# Patient Record
Sex: FEMALE | Race: BLACK OR AFRICAN AMERICAN | Employment: UNEMPLOYED | URBAN - METROPOLITAN AREA
[De-identification: names, ages, dates, MRNs, and addresses within clinical notes are randomized per-mention and may not be internally consistent; named-entity substitution may affect disease eponyms.]

---

## 2019-01-01 ENCOUNTER — TELEPHONE (OUTPATIENT)
Dept: INTERNAL MEDICINE CLINIC | Age: 39
End: 2019-01-01

## 2019-01-01 ENCOUNTER — ANESTHESIA EVENT (OUTPATIENT)
Dept: ENDOSCOPY | Age: 39
End: 2019-01-01
Payer: COMMERCIAL

## 2019-01-01 ENCOUNTER — ANESTHESIA (OUTPATIENT)
Dept: ENDOSCOPY | Age: 39
End: 2019-01-01
Payer: COMMERCIAL

## 2019-01-01 ENCOUNTER — HOSPITAL ENCOUNTER (EMERGENCY)
Age: 39
Discharge: HOME | End: 2019-06-03
Attending: EMERGENCY MEDICINE
Payer: COMMERCIAL

## 2019-01-01 ENCOUNTER — HOSPITAL ENCOUNTER (OUTPATIENT)
Age: 39
Setting detail: OUTPATIENT SURGERY
Discharge: HOME | End: 2019-07-03
Attending: INTERNAL MEDICINE | Admitting: INTERNAL MEDICINE
Payer: COMMERCIAL

## 2019-01-01 ENCOUNTER — OFFICE VISIT (OUTPATIENT)
Dept: OBGYN CLINIC | Age: 39
End: 2019-01-01

## 2019-01-01 ENCOUNTER — HOSPITAL ENCOUNTER (OUTPATIENT)
Age: 39
Setting detail: OUTPATIENT SURGERY
Discharge: HOME | End: 2019-09-13
Attending: INTERNAL MEDICINE | Admitting: INTERNAL MEDICINE
Payer: COMMERCIAL

## 2019-01-01 VITALS
HEART RATE: 73 BPM | BODY MASS INDEX: 40.59 KG/M2 | WEIGHT: 274.03 LBS | OXYGEN SATURATION: 96 % | HEIGHT: 69 IN | TEMPERATURE: 98.1 F | RESPIRATION RATE: 19 BRPM | DIASTOLIC BLOOD PRESSURE: 92 MMHG | SYSTOLIC BLOOD PRESSURE: 144 MMHG

## 2019-01-01 VITALS
HEART RATE: 92 BPM | BODY MASS INDEX: 39.69 KG/M2 | DIASTOLIC BLOOD PRESSURE: 83 MMHG | WEIGHT: 268 LBS | RESPIRATION RATE: 16 BRPM | SYSTOLIC BLOOD PRESSURE: 149 MMHG | TEMPERATURE: 98.2 F | HEIGHT: 69 IN | OXYGEN SATURATION: 100 %

## 2019-01-01 VITALS
OXYGEN SATURATION: 100 % | BODY MASS INDEX: 40.58 KG/M2 | SYSTOLIC BLOOD PRESSURE: 132 MMHG | HEIGHT: 69 IN | WEIGHT: 274 LBS | RESPIRATION RATE: 16 BRPM | TEMPERATURE: 98.8 F | HEART RATE: 79 BPM | DIASTOLIC BLOOD PRESSURE: 89 MMHG

## 2019-01-01 VITALS
BODY MASS INDEX: 40.58 KG/M2 | DIASTOLIC BLOOD PRESSURE: 84 MMHG | SYSTOLIC BLOOD PRESSURE: 126 MMHG | HEIGHT: 69 IN | WEIGHT: 274 LBS

## 2019-01-01 DIAGNOSIS — L81.9 DISCOLORATION OF SKIN OF FINGER: Primary | ICD-10-CM

## 2019-01-01 DIAGNOSIS — L30.8 OTHER ECZEMA: ICD-10-CM

## 2019-01-01 DIAGNOSIS — Z30.09 FAMILY PLANNING COUNSELING: Primary | ICD-10-CM

## 2019-01-01 LAB
25(OH)D3+25(OH)D2 SERPL-MCNC: 13.9 NG/ML (ref 30–100)
ALBUMIN SERPL-MCNC: 4.8 G/DL (ref 3.5–5.5)
ALBUMIN/CREAT UR: 4.3 MG/G CREAT (ref 0–30)
ALBUMIN/GLOB SERPL: 1.7 {RATIO} (ref 1.2–2.2)
ALP SERPL-CCNC: 104 IU/L (ref 39–117)
ALT SERPL-CCNC: 18 IU/L (ref 0–32)
APPEARANCE UR: CLEAR
AST SERPL-CCNC: 17 IU/L (ref 0–40)
BASOPHILS # BLD AUTO: 0 X10E3/UL (ref 0–0.2)
BASOPHILS NFR BLD AUTO: 0 %
BILIRUB SERPL-MCNC: <0.2 MG/DL (ref 0–1.2)
BILIRUB UR QL STRIP: NEGATIVE
BUN SERPL-MCNC: 10 MG/DL (ref 6–24)
BUN/CREAT SERPL: 12 (ref 9–23)
CALCIUM SERPL-MCNC: 9.6 MG/DL (ref 8.7–10.2)
CHLORIDE SERPL-SCNC: 102 MMOL/L (ref 96–106)
CHOLEST SERPL-MCNC: 209 MG/DL (ref 100–199)
CO2 SERPL-SCNC: 18 MMOL/L (ref 20–29)
COLOR UR: YELLOW
CREAT SERPL-MCNC: 0.81 MG/DL (ref 0.57–1)
CREAT UR-MCNC: 143 MG/DL
EOSINOPHIL # BLD AUTO: 0 X10E3/UL (ref 0–0.4)
EOSINOPHIL NFR BLD AUTO: 0 %
ERYTHROCYTE [DISTWIDTH] IN BLOOD BY AUTOMATED COUNT: 13.9 % (ref 12.3–15.4)
EST. AVERAGE GLUCOSE BLD GHB EST-MCNC: 163 MG/DL
GLOBULIN SER CALC-MCNC: 2.9 G/DL (ref 1.5–4.5)
GLUCOSE SERPL-MCNC: 141 MG/DL (ref 65–99)
GLUCOSE UR QL: NEGATIVE
HBA1C MFR BLD: 7.3 % (ref 4.8–5.6)
HCG UR QL: NEGATIVE
HCT VFR BLD AUTO: 38.4 % (ref 34–46.6)
HDLC SERPL-MCNC: 67 MG/DL
HGB BLD-MCNC: 13 G/DL (ref 11.1–15.9)
HGB UR QL STRIP: NEGATIVE
IMM GRANULOCYTES # BLD AUTO: 0 X10E3/UL (ref 0–0.1)
IMM GRANULOCYTES NFR BLD AUTO: 0 %
INTERPRETATION, 910389: NORMAL
KETONES UR QL STRIP: ABNORMAL
LDLC SERPL CALC-MCNC: 126 MG/DL (ref 0–99)
LEUKOCYTE ESTERASE UR QL STRIP: NEGATIVE
LYMPHOCYTES # BLD AUTO: 2 X10E3/UL (ref 0.7–3.1)
LYMPHOCYTES NFR BLD AUTO: 22 %
Lab: NORMAL
MCH RBC QN AUTO: 27 PG (ref 26.6–33)
MCHC RBC AUTO-ENTMCNC: 33.9 G/DL (ref 31.5–35.7)
MCV RBC AUTO: 80 FL (ref 79–97)
MICRO URNS: ABNORMAL
MICROALBUMIN UR-MCNC: 6.2 UG/ML
MONOCYTES # BLD AUTO: 0.7 X10E3/UL (ref 0.1–0.9)
MONOCYTES NFR BLD AUTO: 8 %
NEUTROPHILS # BLD AUTO: 6.3 X10E3/UL (ref 1.4–7)
NEUTROPHILS NFR BLD AUTO: 70 %
NITRITE UR QL STRIP: NEGATIVE
PH UR STRIP: 5.5 [PH] (ref 5–7.5)
PLATELET # BLD AUTO: 415 X10E3/UL (ref 150–450)
POTASSIUM SERPL-SCNC: 4.6 MMOL/L (ref 3.5–5.2)
PROT SERPL-MCNC: 7.7 G/DL (ref 6–8.5)
PROT UR QL STRIP: NEGATIVE
RBC # BLD AUTO: 4.81 X10E6/UL (ref 3.77–5.28)
SODIUM SERPL-SCNC: 137 MMOL/L (ref 134–144)
SP GR UR: 1.02 (ref 1–1.03)
TRIGL SERPL-MCNC: 80 MG/DL (ref 0–149)
TSH SERPL DL<=0.005 MIU/L-ACNC: 0.5 UIU/ML (ref 0.45–4.5)
UROBILINOGEN UR STRIP-MCNC: 0.2 MG/DL (ref 0.2–1)
VLDLC SERPL CALC-MCNC: 16 MG/DL (ref 5–40)
WBC # BLD AUTO: 9 X10E3/UL (ref 3.4–10.8)

## 2019-01-01 PROCEDURE — 76060000031 HC ANESTHESIA FIRST 0.5 HR: Performed by: INTERNAL MEDICINE

## 2019-01-01 PROCEDURE — 74011250636 HC RX REV CODE- 250/636: Performed by: INTERNAL MEDICINE

## 2019-01-01 PROCEDURE — 77030021593 HC FCPS BIOP ENDOSC BSC -A: Performed by: INTERNAL MEDICINE

## 2019-01-01 PROCEDURE — 74011000250 HC RX REV CODE- 250: Performed by: NURSE ANESTHETIST, CERTIFIED REGISTERED

## 2019-01-01 PROCEDURE — 81025 URINE PREGNANCY TEST: CPT

## 2019-01-01 PROCEDURE — 74011250636 HC RX REV CODE- 250/636

## 2019-01-01 PROCEDURE — 76040000019: Performed by: INTERNAL MEDICINE

## 2019-01-01 PROCEDURE — 74011250636 HC RX REV CODE- 250/636: Performed by: NURSE ANESTHETIST, CERTIFIED REGISTERED

## 2019-01-01 PROCEDURE — 88305 TISSUE EXAM BY PATHOLOGIST: CPT

## 2019-01-01 PROCEDURE — 99282 EMERGENCY DEPT VISIT SF MDM: CPT

## 2019-01-01 PROCEDURE — 77030027957 HC TBNG IRR ENDOGTR BUSS -B: Performed by: INTERNAL MEDICINE

## 2019-01-01 RX ORDER — PROPOFOL 10 MG/ML
INJECTION, EMULSION INTRAVENOUS AS NEEDED
Status: DISCONTINUED | OUTPATIENT
Start: 2019-01-01 | End: 2019-01-01 | Stop reason: HOSPADM

## 2019-01-01 RX ORDER — SODIUM CHLORIDE 9 MG/ML
50 INJECTION, SOLUTION INTRAVENOUS CONTINUOUS
Status: DISCONTINUED | OUTPATIENT
Start: 2019-01-01 | End: 2019-01-01 | Stop reason: HOSPADM

## 2019-01-01 RX ORDER — SULFAMETHOXAZOLE AND TRIMETHOPRIM 800; 160 MG/1; MG/1
1 TABLET ORAL 2 TIMES DAILY
Qty: 14 TAB | Refills: 0 | Status: SHIPPED | OUTPATIENT
Start: 2019-01-01 | End: 2019-01-01

## 2019-01-01 RX ORDER — TRIAMCINOLONE ACETONIDE OINTMENT USP, 0.05% 0.5 MG/G
430 OINTMENT TOPICAL 2 TIMES DAILY
Qty: 430 G | Refills: 0 | Status: CANCELLED | OUTPATIENT
Start: 2019-01-01

## 2019-01-01 RX ORDER — FLUMAZENIL 0.1 MG/ML
0.2 INJECTION INTRAVENOUS
Status: DISCONTINUED | OUTPATIENT
Start: 2019-01-01 | End: 2019-01-01 | Stop reason: HOSPADM

## 2019-01-01 RX ORDER — FENTANYL CITRATE 50 UG/ML
200 INJECTION, SOLUTION INTRAMUSCULAR; INTRAVENOUS
Status: DISCONTINUED | OUTPATIENT
Start: 2019-01-01 | End: 2019-01-01 | Stop reason: HOSPADM

## 2019-01-01 RX ORDER — SODIUM CHLORIDE 0.9 % (FLUSH) 0.9 %
5-40 SYRINGE (ML) INJECTION AS NEEDED
Status: DISCONTINUED | OUTPATIENT
Start: 2019-01-01 | End: 2019-01-01 | Stop reason: HOSPADM

## 2019-01-01 RX ORDER — EPINEPHRINE 0.1 MG/ML
1 INJECTION INTRACARDIAC; INTRAVENOUS
Status: DISCONTINUED | OUTPATIENT
Start: 2019-01-01 | End: 2019-01-01 | Stop reason: HOSPADM

## 2019-01-01 RX ORDER — NALOXONE HYDROCHLORIDE 0.4 MG/ML
0.4 INJECTION, SOLUTION INTRAMUSCULAR; INTRAVENOUS; SUBCUTANEOUS
Status: DISCONTINUED | OUTPATIENT
Start: 2019-01-01 | End: 2019-01-01 | Stop reason: HOSPADM

## 2019-01-01 RX ORDER — DEXTROMETHORPHAN/PSEUDOEPHED 2.5-7.5/.8
1.2 DROPS ORAL
Status: DISCONTINUED | OUTPATIENT
Start: 2019-01-01 | End: 2019-01-01 | Stop reason: HOSPADM

## 2019-01-01 RX ORDER — MIDAZOLAM HYDROCHLORIDE 1 MG/ML
.25-1 INJECTION, SOLUTION INTRAMUSCULAR; INTRAVENOUS
Status: DISCONTINUED | OUTPATIENT
Start: 2019-01-01 | End: 2019-01-01 | Stop reason: HOSPADM

## 2019-01-01 RX ORDER — SODIUM CHLORIDE 9 MG/ML
150 INJECTION, SOLUTION INTRAVENOUS CONTINUOUS
Status: DISCONTINUED | OUTPATIENT
Start: 2019-01-01 | End: 2019-01-01 | Stop reason: HOSPADM

## 2019-01-01 RX ORDER — ATROPINE SULFATE 0.1 MG/ML
0.5 INJECTION INTRAVENOUS
Status: DISCONTINUED | OUTPATIENT
Start: 2019-01-01 | End: 2019-01-01 | Stop reason: HOSPADM

## 2019-01-01 RX ORDER — TRIAMCINOLONE ACETONIDE OINTMENT USP, 0.05% 0.5 MG/G
430 OINTMENT TOPICAL 2 TIMES DAILY
Qty: 430 G | Refills: 0 | Status: SHIPPED | OUTPATIENT
Start: 2019-01-01

## 2019-01-01 RX ORDER — FLUCONAZOLE 100 MG/1
200 TABLET ORAL DAILY
Qty: 14 TAB | Refills: 0 | Status: SHIPPED | OUTPATIENT
Start: 2019-01-01 | End: 2019-01-01

## 2019-01-01 RX ORDER — LIDOCAINE HYDROCHLORIDE 20 MG/ML
INJECTION, SOLUTION EPIDURAL; INFILTRATION; INTRACAUDAL; PERINEURAL AS NEEDED
Status: DISCONTINUED | OUTPATIENT
Start: 2019-01-01 | End: 2019-01-01 | Stop reason: HOSPADM

## 2019-01-01 RX ORDER — SODIUM CHLORIDE 0.9 % (FLUSH) 0.9 %
5-40 SYRINGE (ML) INJECTION EVERY 8 HOURS
Status: DISCONTINUED | OUTPATIENT
Start: 2019-01-01 | End: 2019-01-01 | Stop reason: HOSPADM

## 2019-01-01 RX ORDER — SODIUM CHLORIDE 9 MG/ML
INJECTION, SOLUTION INTRAVENOUS
Status: DISCONTINUED | OUTPATIENT
Start: 2019-01-01 | End: 2019-01-01 | Stop reason: HOSPADM

## 2019-01-01 RX ORDER — FENTANYL CITRATE 50 UG/ML
25-200 INJECTION, SOLUTION INTRAMUSCULAR; INTRAVENOUS
Status: DISCONTINUED | OUTPATIENT
Start: 2019-01-01 | End: 2019-01-01 | Stop reason: HOSPADM

## 2019-01-01 RX ORDER — MIDAZOLAM HYDROCHLORIDE 1 MG/ML
.25-5 INJECTION, SOLUTION INTRAMUSCULAR; INTRAVENOUS
Status: DISCONTINUED | OUTPATIENT
Start: 2019-01-01 | End: 2019-01-01 | Stop reason: HOSPADM

## 2019-01-01 RX ORDER — DIPHENHYDRAMINE HYDROCHLORIDE 50 MG/ML
50 INJECTION, SOLUTION INTRAMUSCULAR; INTRAVENOUS ONCE
Status: DISCONTINUED | OUTPATIENT
Start: 2019-01-01 | End: 2019-01-01 | Stop reason: HOSPADM

## 2019-01-01 RX ADMIN — PROPOFOL 100 MG: 10 INJECTION, EMULSION INTRAVENOUS at 16:44

## 2019-01-01 RX ADMIN — SODIUM CHLORIDE: 9 INJECTION, SOLUTION INTRAVENOUS at 16:33

## 2019-01-01 RX ADMIN — SODIUM CHLORIDE: 900 INJECTION, SOLUTION INTRAVENOUS at 15:50

## 2019-01-01 RX ADMIN — LIDOCAINE HYDROCHLORIDE 100 MG: 20 INJECTION, SOLUTION EPIDURAL; INFILTRATION; INTRACAUDAL; PERINEURAL at 16:37

## 2019-01-01 RX ADMIN — PROPOFOL 100 MG: 10 INJECTION, EMULSION INTRAVENOUS at 16:42

## 2019-01-01 RX ADMIN — PROPOFOL 50 MG: 10 INJECTION, EMULSION INTRAVENOUS at 16:53

## 2019-01-01 RX ADMIN — PROPOFOL 50 MG: 10 INJECTION, EMULSION INTRAVENOUS at 16:07

## 2019-01-01 RX ADMIN — PROPOFOL 50 MG: 10 INJECTION, EMULSION INTRAVENOUS at 16:45

## 2019-01-01 RX ADMIN — PROPOFOL 50 MG: 10 INJECTION, EMULSION INTRAVENOUS at 16:47

## 2019-01-01 RX ADMIN — PROPOFOL 100 MG: 10 INJECTION, EMULSION INTRAVENOUS at 16:39

## 2019-01-01 RX ADMIN — PROPOFOL 100 MG: 10 INJECTION, EMULSION INTRAVENOUS at 16:37

## 2019-01-01 RX ADMIN — PROPOFOL 50 MG: 10 INJECTION, EMULSION INTRAVENOUS at 15:59

## 2019-01-01 RX ADMIN — LIDOCAINE HYDROCHLORIDE 100 MG: 20 INJECTION, SOLUTION EPIDURAL; INFILTRATION; INTRACAUDAL; PERINEURAL at 15:59

## 2019-01-01 RX ADMIN — PROPOFOL 50 MG: 10 INJECTION, EMULSION INTRAVENOUS at 16:03

## 2019-01-01 RX ADMIN — PROPOFOL 50 MG: 10 INJECTION, EMULSION INTRAVENOUS at 16:50

## 2019-01-01 RX ADMIN — PROPOFOL 50 MG: 10 INJECTION, EMULSION INTRAVENOUS at 16:00

## 2019-01-01 RX ADMIN — PROPOFOL 25 MG: 10 INJECTION, EMULSION INTRAVENOUS at 16:55

## 2019-01-01 RX ADMIN — PROPOFOL 50 MG: 10 INJECTION, EMULSION INTRAVENOUS at 16:02

## 2019-01-30 ENCOUNTER — OFFICE VISIT (OUTPATIENT)
Dept: INTERNAL MEDICINE CLINIC | Age: 39
End: 2019-01-30

## 2019-01-30 VITALS
HEART RATE: 90 BPM | WEIGHT: 268 LBS | TEMPERATURE: 98.4 F | HEIGHT: 69 IN | RESPIRATION RATE: 16 BRPM | DIASTOLIC BLOOD PRESSURE: 83 MMHG | BODY MASS INDEX: 39.69 KG/M2 | SYSTOLIC BLOOD PRESSURE: 139 MMHG | OXYGEN SATURATION: 100 %

## 2019-01-30 DIAGNOSIS — N92.6 IRREGULAR MENSES: ICD-10-CM

## 2019-01-30 DIAGNOSIS — Q80.0 ICHTHYOSIS VULGARIS: Chronic | ICD-10-CM

## 2019-01-30 DIAGNOSIS — E11.8 TYPE 2 DIABETES MELLITUS WITH COMPLICATION, WITHOUT LONG-TERM CURRENT USE OF INSULIN (HCC): ICD-10-CM

## 2019-01-30 DIAGNOSIS — Z12.39 SCREENING BREAST EXAMINATION: ICD-10-CM

## 2019-01-30 DIAGNOSIS — Z00.00 ROUTINE GENERAL MEDICAL EXAMINATION AT A HEALTH CARE FACILITY: Primary | ICD-10-CM

## 2019-01-30 DIAGNOSIS — I10 ESSENTIAL HYPERTENSION: ICD-10-CM

## 2019-01-30 DIAGNOSIS — E55.9 VITAMIN D DEFICIENCY: ICD-10-CM

## 2019-01-30 NOTE — PROGRESS NOTES
HISTORY OF PRESENT ILLNESS Rupal Cintron is a 43 y.o. female here after 5 years. She is here for complete physical. 
She is morbidly obese, weight is same like last time. Watching diet and exercise but not able to lose weight. Wondering if she can use diet pills or see bariatric surgeon. Has irregular menses. Planning to get pregnant. Would like to see genetic specialist to talk about her medical conditions for which she get pregnant. Her skin is very dry, she had using a topical cream.  Seeing a skin specialist. 
Blood pressure slightly elevated, she is not using any other day hypertensive medications. She was diabetic also. Not on medications. Need a mammogram. 
HPI Review of Systems Constitutional: Negative. HENT: Negative. Eyes: Negative. Respiratory: Negative. Cardiovascular: Negative. Gastrointestinal: Negative. Genitourinary: Negative. Musculoskeletal: Negative. Skin: Positive for rash. Neurological: Negative. Endo/Heme/Allergies: Negative. Psychiatric/Behavioral: Negative. Physical Exam  
Constitutional: She is oriented to person, place, and time. She appears well-developed and well-nourished. No distress. HENT:  
Head: Normocephalic and atraumatic. Right Ear: External ear normal.  
Left Ear: External ear normal.  
Nose: Nose normal.  
Mouth/Throat: Oropharynx is clear and moist. No oropharyngeal exudate. Eyes: Conjunctivae and EOM are normal. Pupils are equal, round, and reactive to light. Neck: Normal range of motion. Neck supple. No JVD present. No thyromegaly present. Cardiovascular: Normal rate, regular rhythm, normal heart sounds and intact distal pulses. Pulmonary/Chest: Effort normal and breath sounds normal. No respiratory distress. She has no wheezes. Abdominal: Soft. Bowel sounds are normal. She exhibits no distension. There is no tenderness. Musculoskeletal: She exhibits no edema or tenderness. Lymphadenopathy: She has no cervical adenopathy. Neurological: She is alert and oriented to person, place, and time. She has normal reflexes. She displays normal reflexes. No cranial nerve deficit. She exhibits normal muscle tone. Coordination normal.  
Skin:  
Very dry and thickened skin all over her body. Psychiatric: She has a normal mood and affect. Her behavior is normal.  
 
 
ASSESSMENT and PLAN Diagnoses and all orders for this visit: 1. Routine general medical examination at a health care facility Seems healthy and exercise. Will check, 
-     CBC WITH AUTOMATED DIFF 
-     METABOLIC PANEL, COMPREHENSIVE 
-     LIPID PANEL 
-     TSH 3RD GENERATION 
-     URINALYSIS W/ RFLX MICROSCOPIC 2. Type 2 diabetes mellitus with complication, without long-term current use of insulin ([address] Not on med. be on ADA diet. Will check, 
-     CBC WITH AUTOMATED DIFF 
-     METABOLIC PANEL, COMPREHENSIVE 
-     LIPID PANEL 
-     MICROALBUMIN, UR, RAND W/ MICROALB/CREAT RATIO 
-     HEMOGLOBIN A1C WITH EAG 3. Essential hypertension Not on med. will check, 
-     CBC WITH AUTOMATED DIFF 
-     METABOLIC PANEL, COMPREHENSIVE 
-     LIPID PANEL 4. Irregular menses She is seeing Dr. Michael Auguste her gynecologist for long time. She would like to try a new gynecologist since she has irregular menses and she would like to get pregnant. She also looking to have a genetic counselor to discuss her medical condition icthyosis before she gets pregnant. 
-     Jennifer Lenz 5. Vitamin D deficiency 
-     VITAMIN D, 25 HYDROXY 6. Ichthyosis vulgaris Seeing skin specialist.  Not using any steroid topical cream right now. Will refer, 
-     REFERRAL TO GENETICS 7. Screening breast examination Will refer, 
-     [facility] MAMMO BI SCREENING INCL CAD; Future Discussed expected course/resolution/complications of diagnosis in detail with patient. Medication risks/benefits/costs/interactions/alternatives discussed with patient. Pt was given an after visit summary which includes diagnoses, current medications & vitals. Pt expressed understanding with the diagnosis and plan.

## 2019-01-30 NOTE — PROGRESS NOTES
Identified pt with two pt identifiers(name and ). Reviewed record in preparation for visit and have obtained necessary documentation. Chief Complaint Patient presents with  Complete Physical  
 Labs Health Maintenance Due Topic  FOOT EXAM Q1   
 MICROALBUMIN Q1   
 Pneumococcal 19-64 Medium Risk (1 of 1 - PPSV23)  DTaP/Tdap/Td series (1 - Tdap)  PAP AKA CERVICAL CYTOLOGY  HEMOGLOBIN A1C Q6M   
 LIPID PANEL Q1   
 EYE EXAM RETINAL OR DILATED Visit Vitals /83 (BP 1 Location: Left arm, BP Patient Position: Sitting) Pulse 90 Temp 98.4 °F (36.9 °C) (Oral) Resp 16 Ht 5' 9\" (1.753 m) Wt 268 lb (121.6 kg) SpO2 100% BMI 39.58 kg/m² Coordination of Care Questionnaire: 
:  
1) Have you been to an emergency room, urgent care, or hospitalized since your last visit?   no If yes, where when, and reason for visit? 2. Have seen or consulted any other health care provider since your last visit? NO If yes, where when, and reason for visit? Patient is accompanied by self I have received verbal consent from Rashaad Andrade to discuss any/all medical information while they are present in the room.

## 2019-01-30 NOTE — PATIENT INSTRUCTIONS
Prediabetes: Care Instructions Your Care Instructions Prediabetes is a warning sign that you are at risk for getting type 2 diabetes. It means that your blood sugar is higher than it should be. The food you eat turns into sugar, which your body uses for energy. Normally, an organ called the pancreas makes insulin, which allows the sugar in your blood to get into your body's cells. But when your body can't use insulin the right way, the sugar doesn't move into cells. It stays in your blood instead. This is called insulin resistance. The buildup of sugar in the blood causes prediabetes. The good news is that lifestyle changes may help you get your blood sugar back to normal and help you avoid or delay diabetes. Follow-up care is a key part of your treatment and safety. Be sure to make and go to all appointments, and call your doctor if you are having problems. It's also a good idea to know your test results and keep a list of the medicines you take. How can you care for yourself at home? · Watch your weight. A healthy weight helps your body use insulin properly. · Limit the amount of calories, sweets, and unhealthy fat you eat. Ask your doctor if you should see a dietitian. A registered dietitian can help you create meal plans that fit your lifestyle. · Get at least 30 minutes of exercise on most days of the week. Exercise helps control your blood sugar. It also helps you maintain a healthy weight. Walking is a good choice. You also may want to do other activities, such as running, swimming, cycling, or playing tennis or team sports. · Do not smoke. Smoking can make prediabetes worse. If you need help quitting, talk to your doctor about stop-smoking programs and medicines. These can increase your chances of quitting for good. · If your doctor prescribed medicines, take them exactly as prescribed. Call your doctor if you think you are having a problem with your medicine. You will get more details on the specific medicines your doctor prescribes. When should you call for help? Watch closely for changes in your health, and be sure to contact your doctor if: 
  · You have any symptoms of diabetes. These may include: 
? Being thirsty more often. ? Urinating more. ? Being hungrier. ? Losing weight. ? Being very tired. ? Having blurry vision.  
  · You have a wound that will not heal.  
  · You have an infection that will not go away.  
  · You have problems with your blood pressure.  
  · You want more information about diabetes and how you can keep from getting it. Where can you learn more? Go to http://gabrielle-marcos.info/. Enter I222 in the search box to learn more about \"Prediabetes: Care Instructions. \" Current as of: July 25, 2018 Content Version: 11.9 © 8498-6577 [employer]. Care instructions adapted under license by [employer] (which disclaims liability or warranty for this information). If you have questions about a medical condition or this instruction, always ask your healthcare professional. [address] any warranty or liability for your use of this information. DASH Diet: Care Instructions Your Care Instructions The DASH diet is an eating plan that can help lower your blood pressure. DASH stands for Dietary Approaches to Stop Hypertension. Hypertension is high blood pressure. The DASH diet focuses on eating foods that are high in calcium, potassium, and magnesium. These nutrients can lower blood pressure. The foods that are highest in these nutrients are fruits, vegetables, low-fat dairy products, nuts, seeds, and legumes. But taking calcium, potassium, and magnesium supplements instead of eating foods that are high in those nutrients does not have the same effect. The DASH diet also includes whole grains, fish, and poultry. The DASH diet is one of several lifestyle changes your doctor may recommend to lower your high blood pressure. Your doctor may also want you to decrease the amount of sodium in your diet. Lowering sodium while following the DASH diet can lower blood pressure even further than just the DASH diet alone. Follow-up care is a key part of your treatment and safety. Be sure to make and go to all appointments, and call your doctor if you are having problems. It's also a good idea to know your test results and keep a list of the medicines you take. How can you care for yourself at home? Following the DASH diet · Eat 4 to 5 servings of fruit each day. A serving is 1 medium-sized piece of fruit, ½ cup chopped or canned fruit, 1/4 cup dried fruit, or 4 ounces (½ cup) of fruit juice. Choose fruit more often than fruit juice. · Eat 4 to 5 servings of vegetables each day. A serving is 1 cup of lettuce or raw leafy vegetables, ½ cup of chopped or cooked vegetables, or 4 ounces (½ cup) of vegetable juice. Choose vegetables more often than vegetable juice. · Get 2 to 3 servings of low-fat and fat-free dairy each day. A serving is 8 ounces of milk, 1 cup of yogurt, or 1 ½ ounces of cheese. · Eat 6 to 8 servings of grains each day. A serving is 1 slice of bread, 1 ounce of dry cereal, or ½ cup of cooked rice, pasta, or cooked cereal. Try to choose whole-grain products as much as possible. · Limit lean meat, poultry, and fish to 2 servings each day. A serving is 3 ounces, about the size of a deck of cards. · Eat 4 to 5 servings of nuts, seeds, and legumes (cooked dried beans, lentils, and split peas) each week. A serving is 1/3 cup of nuts, 2 tablespoons of seeds, or ½ cup of cooked beans or peas. · Limit fats and oils to 2 to 3 servings each day. A serving is 1 teaspoon of vegetable oil or 2 tablespoons of salad dressing. · Limit sweets and added sugars to 5 servings or less a week.  A serving is 1 tablespoon jelly or jam, ½ cup sorbet, or 1 cup of lemonade. · Eat less than 2,300 milligrams (mg) of sodium a day. If you limit your sodium to 1,500 mg a day, you can lower your blood pressure even more. Tips for success · Start small. Do not try to make dramatic changes to your diet all at once. You might feel that you are missing out on your favorite foods and then be more likely to not follow the plan. Make small changes, and stick with them. Once those changes become habit, add a few more changes. · Try some of the following: ? Make it a goal to eat a fruit or vegetable at every meal and at snacks. This will make it easy to get the recommended amount of fruits and vegetables each day. ? Try yogurt topped with fruit and nuts for a snack or healthy dessert. ? Add lettuce, tomato, cucumber, and onion to sandwiches. ? Combine a ready-made pizza crust with low-fat mozzarella cheese and lots of vegetable toppings. Try using tomatoes, squash, spinach, broccoli, carrots, cauliflower, and onions. ? Have a variety of cut-up vegetables with a low-fat dip as an appetizer instead of chips and dip. ? Sprinkle sunflower seeds or chopped almonds over salads. Or try adding chopped walnuts or almonds to cooked vegetables. ? Try some vegetarian meals using beans and peas. Add garbanzo or kidney beans to salads. Make burritos and tacos with mashed steinberg beans or black beans. Where can you learn more? Go to http://gabrielle-marcos.info/. Enter H605 in the search box to learn more about \"DASH Diet: Care Instructions. \" Current as of: July 22, 2018 Content Version: 11.9 © 2216-9920 [employer]. Care instructions adapted under license by [employer] (which disclaims liability or warranty for this information).  If you have questions about a medical condition or this instruction, always ask your healthcare professional. [employer] disclaims any warranty or liability for your use of this information.

## 2019-04-15 NOTE — TELEPHONE ENCOUNTER
The 430g triamcinolone acetonide 0.05% ointment has been discontinued.  Pharmacy has 1% in stock  Please call pharmacy for update

## 2019-04-17 NOTE — TELEPHONE ENCOUNTER
Spoke with the pharmacy at [facility] and they stated that they are waiting for the patient to  the medication from the pharmacy.

## 2019-06-03 NOTE — ED NOTES
April, [place] gave and reviewed discharge instructions with patient. Patient verbalizes understanding of discharge instructions. Pt alert and oriented, appears in no acute distress, respirations equal and unlabored. Ambulatory upon discharge with steady gait.

## 2019-06-03 NOTE — DISCHARGE INSTRUCTIONS
Patient Education        Toenail Fungus: Care Instructions  Your Care Instructions  A toenail that is infected by a fungus usually turns white or yellow. As the fungus spreads, the nail turns a darker color and gets thicker, and its edges start to turn ragged and crumble. A bad infection can cause toe pain, and the nail may pull away from the toe. Toenails that are exposed to moisture and warmth a lot are more likely to get infected by a fungus. This can happen from wearing sweaty shoes often and from walking barefoot on shower floors. It is hard to treat toenail fungus, and the infection can return after it has cleared up. But medicines can sometimes get rid of toenail fungus for good. If the infection is very bad, or if it causes a lot of pain, you may need to have the nail removed. Follow-up care is a key part of your treatment and safety. Be sure to make and go to all appointments, and call your doctor if you are having problems. It's also a good idea to know your test results and keep a list of the medicines you take. How can you care for yourself at home? · Take your medicines exactly as prescribed. Call your doctor if you have any problems with your medicine. You will get more details on the specific medicines your doctor prescribes. · If your doctor gave you a cream or liquid to put on your toenail, use it exactly as directed. · Wash your feet often, and wash your hands after touching your feet. · Keep your toenails clean and dry. Dry your feet completely after you bathe and before you put on shoes and socks. · Keep your toenails trimmed. · Change socks often. Wear dry socks that absorb moisture. · Do not go barefoot in public places. · Use a spray or powder that fights fungus on your feet and in your shoes. · Do not pick at the skin around your nails. · Do not use nail polish or fake nails on your toenails. When should you call for help?   Call your doctor now or seek immediate medical care if:    · You have signs of infection, such as:  ? Increased pain, swelling, warmth, or redness. ? Red streaks leading from the site. ? Pus draining from the site. ? A fever.     · You have new or increased toe pain.    Watch closely for changes in your health, and be sure to contact your doctor if:    · You do not get better as expected. Where can you learn more? Go to http://gabrielle-marcos.info/. Enter D202 in the search box to learn more about \"Toenail Fungus: Care Instructions. \"  Current as of: April 17, 2018  Content Version: 11.9  © 2554-3086 [employer]. Care instructions adapted under license by [employer] (which disclaims liability or warranty for this information). If you have questions about a medical condition or this instruction, always ask your healthcare professional. [address] any warranty or liability for your use of this information.

## 2019-06-03 NOTE — ED TRIAGE NOTES
Complaining of pain right ring finger nail. She  has discoloration near the cuticle. She denies any trauma.

## 2019-06-03 NOTE — ED PROVIDER NOTES
34 yo female with complaint rt 4th finger pain and discoloration to the proximal nail bed for past four days. No injury to the area. No drainage from the area. Last manicure one month ago. No fever, headache, sore throat, cough, rhinorrhea, sneezing, SOB, abdominal pain, nausea, vomiting, or urinary complaints. Hx of similar associated with a fungal infection. The history is provided by the patient.    Finger Pain           Past Medical History:   Diagnosis Date    Back pain     Bowel obstruction 07/2008    Diabetes ([address]     Dysmenorrhea     Dyspepsia and other specified disorders of function of stomach     Hypertension     Maxillary sinusitis     Musculoskeletal disorder     Other specified disease of sebaceous glands     Rhinitis allergic     Stool color black     Ulcerative colitis     Variants of migraine, not elsewhere classified, with intractable migraine, so stated, without mention of status migrainosus        Past Surgical History:   Procedure Laterality Date    ABDOMEN SURGERY PROC UNLISTED      bowel resection    HX APPENDECTOMY           Family History:   Problem Relation Age of Onset    Hypertension Mother     Diabetes Father     Heart Disease Father     Hypertension Father     Stroke Maternal Grandmother        Social History     Socioeconomic History    Marital status:      Spouse name: Not on file    Number of children: Not on file    Years of education: Not on file    Highest education level: Not on file   Occupational History    Not on file   Social Needs    Financial resource strain: Not on file    Food insecurity:     Worry: Not on file     Inability: Not on file    Transportation needs:     Medical: Not on file     Non-medical: Not on file   Tobacco Use    Smoking status: Never Smoker    Smokeless tobacco: Never Used   Substance and Sexual Activity    Alcohol use: Yes     Comment: socially    Drug use: No    Sexual activity: Yes   Lifestyle    Physical activity:     Days per week: Not on file     Minutes per session: Not on file    Stress: Not on file   Relationships    Social connections:     Talks on phone: Not on file     Gets together: Not on file     Attends Hinduism service: Not on file     Active member of club or organization: Not on file     Attends meetings of clubs or organizations: Not on file     Relationship status: Not on file    Intimate partner violence:     Fear of current or ex partner: Not on file     Emotionally abused: Not on file     Physically abused: Not on file     Forced sexual activity: Not on file   Other Topics Concern    Not on file   Social History Narrative    Not on file         ALLERGIES: Latex    Review of Systems   Constitutional: Negative for fever. HENT: Positive for congestion and sinus pressure. Negative for ear pain and sore throat. Respiratory: Negative for cough, chest tightness and shortness of breath. Gastrointestinal: Negative for abdominal pain, constipation, diarrhea and nausea. Genitourinary: Negative for dysuria, frequency and urgency. Musculoskeletal: Positive for arthralgias (rt 4th finger). Skin: Positive for color change. Vitals:    06/03/19 1720   SpO2: 100%            Physical Exam   Constitutional: She appears well-developed and well-nourished. No distress. Well appearing adult female in NAD   HENT:   Head: Normocephalic and atraumatic. Right Ear: External ear normal.   Left Ear: External ear normal.   Mouth/Throat: Oropharynx is clear and moist.   Eyes: Pupils are equal, round, and reactive to light. Conjunctivae are normal.   Cardiovascular: Normal rate, regular rhythm and normal heart sounds. Pulmonary/Chest: Effort normal. No respiratory distress. She has no wheezes. Abdominal: Soft. Bowel sounds are normal. She exhibits no distension. There is no tenderness. There is no rebound. Neurological: She is alert. Skin: Skin is warm and dry.  No ecchymosis, no laceration and no lesion noted. Nursing note and vitals reviewed. MDM  Number of Diagnoses or Management Options  Discoloration of skin of finger:   Diagnosis management comments: 34 yo female with complaint of rt 4th finger nail discoloration. ? Bacteria vs fungal vs eczema    Plan  Bactrim  Diflucan  PCP follow-up.  [place]is [place], [place]           Procedures

## 2019-06-17 NOTE — TELEPHONE ENCOUNTER
Called the pt and advised her that Dr. Luo Gave will review her labs and she will be called with her results. The pt voiced thanks and understanding.

## 2019-06-18 NOTE — PROGRESS NOTES
Diabetes is worse, need to start medicine. Be on ADA diet and exercise. Need office visit. Need to watch carbohydrate and sweets. Total cholesterol and LDL slightly elevated. Be on low-cholesterol diet and exercise.

## 2019-06-19 NOTE — PROGRESS NOTES
The pt called the office and requested her lab results. After verifying HIPAA, reviewed the pt's labs with her and also discussed the provider's recommendations. The pt was provided an appt for July 1, 2019 @ 1300 with an arrival time of 1245. The pt voiced thanks and full understanding.

## 2019-06-24 NOTE — PROGRESS NOTES
Dmitriy Coffey is a 35 y.o. female who desires to concieve; patient states her cycle are painful    Challenged due hx ulcerative colitis and ichthyosis vulgaris; requires extensive skin care. Hx SBO. Recently diagnosed with diabetes; exercising on regular basis  Her current method of family planning is none. The patient is not sexually active. She developed this problem approximately 3 years ago. Her relevant past medical history:   Past Medical History:   Diagnosis Date    Back pain     Bowel obstruction 07/2008    Diabetes ([address]     Dysmenorrhea     Dyspepsia and other specified disorders of function of stomach     Hypertension     Maxillary sinusitis     Musculoskeletal disorder     Other specified disease of sebaceous glands     Rhinitis allergic     Stool color black     Ulcerative colitis     Variants of migraine, not elsewhere classified, with intractable migraine, so stated, without mention of status migrainosus         Past Surgical History:   Procedure Laterality Date    ABDOMEN SURGERY PROC UNLISTED      bowel resection    HX APPENDECTOMY       Social History     Occupational History    Not on file   Tobacco Use    Smoking status: Never Smoker    Smokeless tobacco: Never Used   Substance and Sexual Activity    Alcohol use: Yes     Comment: socially    Drug use: No    Sexual activity: Yes     Family History   Problem Relation Age of Onset    Hypertension Mother     Diabetes Father     Heart Disease Father     Hypertension Father     Stroke Maternal Grandmother        Allergies   Allergen Reactions    Latex Rash     Prior to Admission medications    Medication Sig Start Date End Date Taking? Authorizing Provider   triamcinolone acetonide 0.05 % oint 430 g by Apply Externally route two (2) times a day.  4/15/19   Bronson Kumari MD        Review of Systems - History obtained from the patient  Constitutional: negative for weight loss, fever, night sweats  HEENT: negative for hearing loss, earache, congestion, snoring, sorethroat  CV: negative for chest pain, palpitations, edema  Resp: negative for cough, shortness of breath, wheezing  Breast: negative for breast lumps, nipple discharge, galactorrhea  GI: negative for change in bowel habits, abdominal pain, black or bloody stools  : negative for frequency, dysuria, hematuria  MSK: negative for back pain, joint pain, muscle pain  Skin: negative for itching, rash, hives  Neuro: negative for dizziness, headache, confusion, weakness  Psych: negative for anxiety, depression, change in mood  Heme/lymph: negative for bleeding, bruising, pallor      Objective:  Visit Vitals  /84   Ht 5' 9\" (1.753 m)   Wt 274 lb (124.3 kg)   LMP 06/04/2019   BMI 40.46 kg/m²          PHYSICAL EXAMINATION    Constitutional  · Appearance: well-nourished, well developed, alert, in no acute distress    HENT  · Head and Face: appears normal    ·      Gastrointestinal  · Abdominal Examination: abdomen non-tender to palpation, normal bowel sounds, no masses present  · Liver and spleen: no hepatomegaly present, spleen not palpable  · Hernias: no hernias identified    Genitourinary  · External Genitalia: normal appearance for age, no discharge present, no tenderness present, no inflammatory lesions present, no masses present, no atrophy present  · Vagina: normal vaginal vault without central or paravaginal defects, no discharge present, no inflammatory lesions present, no masses present  · Bladder: non-tender to palpation  · Urethra: appears normal  · Cervix: normal   · Uterus: normal size, shape and consistency  · Adnexa: no adnexal tenderness present, no adnexal masses present  · Perineum: perineum within normal limits, no evidence of trauma, no rashes or skin lesions present  · Anus: anus within normal limits, no hemorrhoids present  · Inguinal Lymph Nodes: no lymphadenopathy present    Skin  · General Inspection: no rash, no lesions identified; c/w known condition    Neurologic/Psychiatric  · Mental Status:  · Orientation: grossly oriented to person, place and time  · Mood and Affect: mood normal, affect appropriate    Assessment:    preconceptual counseling  Dysmenorrhea  Newly diagnosed diabetes  Ichthyosis vulgaris    Plan:   Reviewed dysmenorrhea w findings of normal pelvic findings; due to GI concerns, don't recommend NSAIDs  Not interested in using ocp at this time due to hopes for pregnancy  Stressed need to improve Hgb A1C  Reviewed SNOW options here in town; egg retrieval/ HSG, etc; literature shared  Total face to face time with patient was 20 minutes and   >50 percent of visit time was  spent for counseling and patient care coordination.

## 2019-07-03 NOTE — PROCEDURES
[address] [facility] Simin Oropeza M.D. 
[address] [place] [address], [facility]0 Adwoa Lubin  
[phone] Colonoscopy Procedure Note NAME: Bhupinder Renae :  1980 MRN:  [id number] Indications:   Personal history of colon polyps (screening only) : Lakhwinder Ruiz MD 
 
Referring Provider:  Osmani Varma (Inactive) Medicines:  MAC anesthesia Procedure Details: After informed consent was obtained with all risks and benefits of the procedure explained and preprocedure exam completed, the patient was placed in the left lateral decubitus position. Universal protocol for patient identification was performed and documented in the nursing notes. Throughout the procedure, the patient's blood pressure was monitored at least every five minutes; pulse, and oxygen saturations were monitored continuously. All vital signs were documented in the nursing notes. A digital rectal exam was performed and was normal.  The Olympus videocolonoscope  was inserted in the rectum and carefully advanced to the cecum, which was identified by the ileocecal valve and appendiceal orifice. The colonoscope was slowly withdrawn with careful evaluation between folds. Retroflexion in the rectum was performed; findings and interventions are described below. Procedure start time, extent reached time/cecum time, and procedure end time are documented in the nursing notes. The quality of preparation was good. Findings:  
Rectum: mild erythema with polypoid changes in the distal 8 cm of the rectum s/p multiple biopsies Sigmoid: surgical anastomosis is normal 
Descending Colon: normal 
Transverse Colon: normal 
Ascending Colon: normal 
Cecum: normal 
Terminal Ileum: normal 
 
Interventions:  
 biopsy of colon rectum Specimens:  
ID Type Source Tests Collected by Time Destination 1 : rectal bx r/o colitis and dysplasia Preservative Rectum  Jerry Valdez MD 7/3/2019 1658 Pathology EBL:  None. Complications: No immediate complications Impression: 
-See post-procedure diagnoses. Await pathology. Recommendations:  
-Await pathology. Recommendation for next colonscopy in 5 years If < 10 years, reason:  above average risk patient Resume normal medication(s). Signed by:  King Dominguez MD 
        7/3/2019  4:58 PM

## 2019-07-03 NOTE — ROUTINE PROCESS
[facility] 1980 
[id number] Situation: 
Verbal report received from: Amparo Cohen RN Procedure: Procedure(s): 
COLONOSCOPY 
COLON BIOPSY Background: 
 
Preoperative diagnosis: PERSONAL HX OF POLYPS Postoperative diagnosis: 1.rectal erythema :  Dr. Elisa Montez Assistant(s): Endoscopy Technician-1: Silvestre Adams Endoscopy RN-1: Olayinka Gauthier Specimens:  
ID Type Source Tests Collected by Time Destination 1 : rectal bx r/o colitis and dysplasia Preservative Rectum  Ubaldo Brown MD 7/3/2019 1652 Pathology H. Pylori  no Assessment: 
Intra-procedure medications Anesthesia gave intra-procedure sedation and medications, see anesthesia flow sheet yes Intravenous fluids: NS@ [facility] Vital signs stable Abdominal assessment: round and soft Recommendation Discharge patient per MD order Family or Friend No 
Permission to share finding with family or friend no

## 2019-07-03 NOTE — H&P
[address] Pre-procedure History and Physical    
 
NAME:  Amy De Los Santos :   1980 MRN:   [id number] CHIEF COMPLAINT/HPI: See procedure note PMH: 
Past Medical History:  
Diagnosis Date  Back pain  Bowel obstruction 2008  Diabetes ([address]  Dysmenorrhea  Dyspepsia and other specified disorders of function of stomach  Hypertension  Maxillary sinusitis  Musculoskeletal disorder  Other specified disease of sebaceous glands  Rhinitis allergic  Stool color black  Ulcerative colitis  Variants of migraine, not elsewhere classified, with intractable migraine, so stated, without mention of status migrainosus PSH: 
Past Surgical History:  
Procedure Laterality Date  ABDOMEN SURGERY PROC UNLISTED    
 bowel resection  HX APPENDECTOMY Allergies: Allergies Allergen Reactions  Latex Rash Home Medications: 
Prior to Admission Medications Prescriptions Last Dose Informant Patient Reported? Taking?  
triamcinolone acetonide 0.05 % oint   No No  
Si g by Apply Externally route two (2) times a day. Facility-Administered Medications: None Hospital Medications: 
Current Facility-Administered Medications Medication Dose Route Frequency  0.9% sodium chloride infusion  150 mL/hr IntraVENous CONTINUOUS  
 sodium chloride (NS) flush 5-40 mL  5-40 mL IntraVENous Q8H  
 sodium chloride (NS) flush 5-40 mL  5-40 mL IntraVENous PRN  
 midazolam (VERSED) injection 0.25-5 mg  0.25-5 mg IntraVENous Multiple  fentaNYL citrate (PF) injection 200 mcg  200 mcg IntraVENous Multiple  simethicone (MYLICON) 76UJ/0.0AR oral drops 80 mg  1.2 mL Oral Multiple  atropine injection 0.5 mg  0.5 mg IntraVENous ONCE PRN  
 EPINEPHrine (ADRENALIN) 0.1 mg/mL syringe 1 mg  1 mg Endoscopically ONCE PRN  
 
 Facility-Administered Medications Ordered in Other Encounters Medication Dose Route Frequency  0.9% sodium chloride infusion   IntraVENous CONTINUOUS Family History: 
Family History Problem Relation Age of Onset  Hypertension Mother  Diabetes Father  Heart Disease Father  Hypertension Father  Stroke Maternal Grandmother Social History: 
Social History Tobacco Use  Smoking status: Never Smoker  Smokeless tobacco: Never Used Substance Use Topics  Alcohol use: Yes Comment: socially PHYSICAL EXAM PRIOR TO SEDATION: 
General: Alert, in no acute distress Lungs:            CTA bilaterally Heart:  Normal S1, S2 Abdomen: Soft, Non distended, Non tender. Normoactive bowel sounds. Assessment:  
Stable for sedation administration. Date of last colonoscopy: 5 yrs, Polyps  No   
Plan:  
· Endoscopic procedure with sedation Signed By: Jazmyn Wilder MD   
 7/3/2019  4:35 PM

## 2019-07-03 NOTE — PERIOP NOTES

## 2019-07-03 NOTE — DISCHARGE INSTRUCTIONS
Leighann Melchor [facility] 912 Meseret Clemens M.D.  [address] [facility], [address] [place] [address]  [phone]          COLONOSCOPY DISCHARGE INSTRUCTIONS    Alaina Wilson  [id number]  1980    DISCOMFORT:  Redness at IV site- apply warm compress to area; if redness or soreness persist- contact your physician  There may be a slight amount of blood passed from the rectum  Gaseous discomfort- walking, belching will help relieve any discomfort  You may not operate a vehicle for 12 hours  You may not engage in an occupation involving machinery or appliances for the  rest of today  You may not drink alcoholic beverages for at least 12 hours  Avoid making any critical decisions for at least 24 hours    DIET:   You may resume your normal diet, but some patients find that heavy or large  meals may lead to indigestion or vomiting. I suggest a light meal as first food  intake. I recommend a whole food, plant-based diet for your overall health. ACTIVITY:  You may resume your normal daily activities. It is recommended that you spend the remainder of the day resting - avoid any strenuous activity. CALL M.D. IF ANY SIGN OF:   Increasing pain, nausea, vomiting  Abdominal distension (swelling)  Significant bleeding (oral or rectal)  Fever   Pain in chest area  Shortness of breath    Additional Instructions:   Call Dr. Meseret Clemens if any questions or problems at [phone]   Setup follow-up office visit in 6 weeks   Should have a repeat colonoscopy in 5 years.  Colonoscopy showed mild redness in the rectum s/p biopsies

## 2019-07-03 NOTE — ANESTHESIA PREPROCEDURE EVALUATION
Relevant Problems No relevant active problems Anesthetic History No history of anesthetic complications Review of Systems / Medical History Patient summary reviewed, nursing notes reviewed and pertinent labs reviewed Pulmonary Within defined limits Neuro/Psych Within defined limits Cardiovascular Hypertension Exercise tolerance: >4 METS 
  
GI/Hepatic/Renal 
  
 
 
 
PUD Comments: UC Endo/Other Diabetes Morbid obesity Other Findings Physical Exam 
 
Airway Mallampati: II 
TM Distance: > 6 cm Neck ROM: normal range of motion Mouth opening: Normal 
 
 Cardiovascular Rhythm: regular Rate: normal 
 
 
 
 Dental 
No notable dental hx Pulmonary Breath sounds clear to auscultation Abdominal 
 
 
 
 Other Findings Anesthetic Plan ASA: 3 Anesthesia type: MAC Induction: Intravenous Anesthetic plan and risks discussed with: Patient

## 2019-07-15 NOTE — ANESTHESIA POSTPROCEDURE EVALUATION
Procedure(s): 
COLONOSCOPY 
COLON BIOPSY. MAC Anesthesia Post Evaluation Multimodal analgesia: multimodal analgesia not used between 6 hours prior to anesthesia start to PACU discharge Patient location during evaluation: PACU Patient participation: complete - patient participated Level of consciousness: awake Pain score: 0 Pain management: adequate Airway patency: patent Anesthetic complications: no 
Cardiovascular status: acceptable Respiratory status: acceptable Hydration status: acceptable Comments: I have evaluated the patient and meets criteria for discharge from PACU. Mary Montero MD 
 
 
 
Vitals Value Taken Time /89 7/3/2019  5:24 PM  
Temp Pulse 79 7/3/2019  5:24 PM  
Resp 16 7/3/2019  5:24 PM  
SpO2 100 % 7/3/2019  5:24 PM

## 2019-09-13 NOTE — H&P
[address], [address] [place] [address]          Pre-procedure History and Physical       NAME:  Janet Canales   :   1980   MRN:   [id number]     CHIEF COMPLAINT/HPI: See procedure note    PMH:  Past Medical History:   Diagnosis Date    Back pain     Bowel obstruction 2008    Diabetes ([address]     Dysmenorrhea     Dyspepsia and other specified disorders of function of stomach     Hypertension     Maxillary sinusitis     Musculoskeletal disorder     Other specified disease of sebaceous glands     Rhinitis allergic     Stool color black     Ulcerative colitis     Variants of migraine, not elsewhere classified, with intractable migraine, so stated, without mention of status migrainosus        PSH:  Past Surgical History:   Procedure Laterality Date    ABDOMEN SURGERY PROC UNLISTED      bowel resection    COLONOSCOPY Left 7/3/2019    [facility] ENDOSCOPY    HX APPENDECTOMY         Allergies: Allergies   Allergen Reactions    Latex Rash       Home Medications:  Prior to Admission Medications   Prescriptions Last Dose Informant Patient Reported? Taking?   triamcinolone acetonide 0.05 % oint   No No   Si g by Apply Externally route two (2) times a day.       Facility-Administered Medications: None       Hospital Medications:  Current Facility-Administered Medications   Medication Dose Route Frequency    0.9% sodium chloride infusion  50 mL/hr IntraVENous CONTINUOUS    sodium chloride (NS) flush 5-40 mL  5-40 mL IntraVENous Q8H    sodium chloride (NS) flush 5-40 mL  5-40 mL IntraVENous PRN    midazolam (VERSED) injection 0.25-10 mg  0.25-10 mg IntraVENous Multiple    fentaNYL citrate (PF) injection  mcg   mcg IntraVENous Multiple    naloxone (NARCAN) injection 0.4 mg  0.4 mg IntraVENous Multiple    flumazenil (ROMAZICON) 0.1 mg/mL injection 0.2 mg  0.2 mg IntraVENous Multiple    benzocaine (HURRICANE) 20 % spray   Mucous Membrane ONCE    simethicone (MYLICON) 14VW/9.0QR oral drops 80 mg  1.2 mL Oral Multiple    diphenhydrAMINE (BENADRYL) injection 50 mg  50 mg IntraVENous ONCE    atropine injection 0.5 mg  0.5 mg IntraVENous ONCE PRN    EPINEPHrine (ADRENALIN) 0.1 mg/mL syringe 1 mg  1 mg Endoscopically ONCE PRN     Facility-Administered Medications Ordered in Other Encounters   Medication Dose Route Frequency    0.9% sodium chloride infusion   IntraVENous CONTINUOUS    propofol (DIPRIVAN) 10 mg/mL injection   IntraVENous PRN       Family History:  Family History   Problem Relation Age of Onset    Hypertension Mother     Diabetes Father     Heart Disease Father     Hypertension Father     Stroke Maternal Grandmother        Social History:  Social History     Tobacco Use    Smoking status: Never Smoker    Smokeless tobacco: Never Used   Substance Use Topics    Alcohol use: Yes     Comment: socially         PHYSICAL EXAM PRIOR TO SEDATION:  General: Alert, in no acute distress    Lungs:            CTA bilaterally  Heart:  Normal S1, S2    Abdomen: Soft, Non distended, Non tender. Normoactive bowel sounds. Assessment:   Stable for sedation administration.     Plan:   · Endoscopic procedure with sedation     Signed By: Priyanka Mclean MD     9/13/2019  4:00 PM

## 2019-09-13 NOTE — DISCHARGE INSTRUCTIONS
Leighann Elkins 9[address] Lucio Ritchie M.D.  [address] [place] [address]  [phone]          COLONOSCOPY DISCHARGE INSTRUCTIONS    Bita Sandoval  [id number]  1980    DISCOMFORT:  Redness at IV site- apply warm compress to area; if redness or soreness persist- contact your physician  There may be a slight amount of blood passed from the rectum  Gaseous discomfort- walking, belching will help relieve any discomfort  You may not operate a vehicle for 12 hours  You may not engage in an occupation involving machinery or appliances for the  rest of today  You may not drink alcoholic beverages for at least 12 hours  Avoid making any critical decisions for at least 24 hours    DIET:   You may resume your normal diet, but some patients find that heavy or large  meals may lead to indigestion or vomiting. I suggest a light meal as first food  intake. I recommend a whole food, plant-based diet for your overall health. ACTIVITY:  You may resume your normal daily activities. It is recommended that you spend the remainder of the day resting - avoid any strenuous activity. CALL M.D. IF ANY SIGN OF:   Increasing pain, nausea, vomiting  Abdominal distension (swelling)  Significant bleeding (oral or rectal)  Fever   Pain in chest area  Shortness of breath    Additional Instructions:   Call Dr. Lucio Ritchie if any questions or problems at [phone]   You should receive the biopsy results by phone or mail within 3 weeks, if not, call  my office for the results      Flex sig showed the polypoid area in the rectum s/p biopsies.

## 2019-09-13 NOTE — ANESTHESIA PREPROCEDURE EVALUATION
Relevant Problems   No relevant active problems       Anesthetic History   No history of anesthetic complications            Review of Systems / Medical History  Patient summary reviewed, nursing notes reviewed and pertinent labs reviewed    Pulmonary  Within defined limits                 Neuro/Psych   Within defined limits           Cardiovascular    Hypertension                   GI/Hepatic/Renal           PUD     Endo/Other    Diabetes    Morbid obesity     Other Findings              Physical Exam    Airway  Mallampati: II  TM Distance: > 6 cm  Neck ROM: normal range of motion   Mouth opening: Normal     Cardiovascular  Regular rate and rhythm,  S1 and S2 normal,  no murmur, click, rub, or gallop             Dental  No notable dental hx       Pulmonary  Breath sounds clear to auscultation               Abdominal  GI exam deferred       Other Findings            Anesthetic Plan    ASA: 3  Anesthesia type: MAC            Anesthetic plan and risks discussed with: Patient

## 2019-09-13 NOTE — ROUTINE PROCESS
Alysa Smith Ndifor  1980  [id number]    Situation:  Verbal report received from: CESAR Vincent RN  Procedure: Procedure(s): FLEXIBLE SIGMOIDOSCOPY  COLON BIOPSY    Background:    Preoperative diagnosis: COLON POLYPS  Postoperative diagnosis: Rectal Polyps    :  Dr. Shayne Lopez  Assistant(s): Endoscopy Technician-1: Damien Putnam  Endoscopy RN-1: Ailyn Pritchett RN    Specimens:   ID Type Source Tests Collected by Time Destination   1 : rectum r/o adenoma r/o dysplasia Preservative Rectum  Nadir Yoon MD 9/13/2019 1608 Pathology     H. Pylori  no    Assessment:  Intra-procedure medications   Anesthesia gave intra-procedure sedation and medications, see anesthesia flow sheet yes    Intravenous fluids: NS@ [facility]     Vital signs stable     Abdominal assessment: round and soft     Recommendation:  Discharge patient per MD order.     Family or Friend   Permission to share finding with family or friend no

## 2019-09-13 NOTE — PROCEDURES
Leighann Mcclain [place] [id number] WADE Nation M.D.  Mona Mcguire, [facility]  [phone]           Flexible Sigmoidoscopy Procedure Note    NAME: Pamela Newton  :  1980  MRN:  [id number]    Indications: Rectal polyps     : Angelito Paige MD    Referring Provider:  Estefany Jefferson (Inactive)    Medicines:  MAC anesthesia      Procedure Details:  After informed consent was obtained with all risks and benefits of the procedure explained and preprocedure exam completed, the patient was placed in the left lateral decubitus position. Universal protocol for patient identification was performed and documented in the nursing notes. Throughout the procedure, the patient's blood pressure was monitored at least every five minutes; pulse, and oxygen saturations were monitored continuously. All vital signs were documented in the nursing notes. A digital rectal exam was performed and was normal.  The Olympus videocolonoscope  was inserted in the rectum and carefully advanced to the descending colon. The colonoscope was slowly withdrawn with careful evaluation between folds. Retroflexion in the rectum was performed; findings and interventions are described below. The quality of preparation was good. Findings:   Rectum: From 8 cm from the anus to the anus is a triangle shaped mild polypoid appearance of the mucosa taking up 60% of the circumference of the lumen s/p multiple biopsies  Sigmoid: anastomosis normal      Interventions:   biopsy of colon rectum    Specimens:   ID Type Source Tests Collected by Time Destination   1 : rectum r/o adenoma r/o dysplasia Preservative Rectum  Emmet Nissen, MD 2019 1608 Pathology       EBL:  None. Complications:   No immediate complications     Impression:  -See post-procedure diagnoses. Recommendations: -Await pathology. Resume normal medication(s). Signed by:  Angelito Paige MD          2019  4:23 PM

## 2019-09-13 NOTE — ANESTHESIA POSTPROCEDURE EVALUATION
Post-Anesthesia Evaluation and Assessment    Patient: Shirley Dill MRN: [id number]  SSN: [id number]    YOB: 1980  Age: 35 y.o. Sex: female      I have evaluated the patient and they are stable and ready for discharge from the PACU. Cardiovascular Function/Vital Signs  Visit Vitals  /78   Pulse 68   Temp 36.7 °C (98.1 °F)   Resp 21   Ht 5' 9\" (1.753 m)   Wt 124.3 kg (274 lb 0.5 oz)   SpO2 98%   BMI 40.47 kg/m²       Patient is status post MAC anesthesia for Procedure(s): FLEXIBLE SIGMOIDOSCOPY  COLON BIOPSY. Nausea/Vomiting: None    Postoperative hydration reviewed and adequate. Pain:  Pain Scale 1: Numeric (0 - 10) (09/13/19 1631)  Pain Intensity 1: 0 (09/13/19 1631)   Managed    Neurological Status: At baseline    Mental Status, Level of Consciousness: Alert and  oriented to person, place, and time    Pulmonary Status:   O2 Device: Room air (09/13/19 1631)   Adequate oxygenation and airway patent    Complications related to anesthesia: None    Post-anesthesia assessment completed. No concerns    Signed By: Nellie Bull MD     September 13, 2019              Procedure(s): FLEXIBLE SIGMOIDOSCOPY  COLON BIOPSY. MAC    <BSHSIANPOST>    Vitals Value Taken Time   BP 0/0 9/13/2019  4:47 PM   Temp 36.7 °C (98.1 °F) 9/13/2019  4:29 PM   Pulse 0 9/13/2019  4:47 PM   Resp 0 9/13/2019  4:50 PM   SpO2 97 % 9/13/2019  4:44 PM   Vitals shown include unvalidated device data.

## 2019-09-13 NOTE — PERIOP NOTES

## 2020-01-01 ENCOUNTER — APPOINTMENT (OUTPATIENT)
Dept: CT IMAGING | Age: 40
End: 2020-01-01
Attending: EMERGENCY MEDICINE
Payer: SELF-PAY

## 2020-01-01 ENCOUNTER — HOSPITAL ENCOUNTER (EMERGENCY)
Age: 40
End: 2020-04-01
Attending: EMERGENCY MEDICINE | Admitting: THORACIC SURGERY (CARDIOTHORACIC VASCULAR SURGERY)
Payer: SELF-PAY

## 2020-01-01 VITALS
DIASTOLIC BLOOD PRESSURE: 152 MMHG | HEART RATE: 139 BPM | RESPIRATION RATE: 54 BRPM | SYSTOLIC BLOOD PRESSURE: 176 MMHG | WEIGHT: 293 LBS | BODY MASS INDEX: 44.5 KG/M2

## 2020-01-01 DIAGNOSIS — I26.09 ACUTE PULMONARY EMBOLISM WITH ACUTE COR PULMONALE, UNSPECIFIED PULMONARY EMBOLISM TYPE (HCC): ICD-10-CM

## 2020-01-01 DIAGNOSIS — J96.01 ACUTE RESPIRATORY FAILURE WITH HYPOXIA (HCC): Primary | ICD-10-CM

## 2020-01-01 DIAGNOSIS — I46.9 CARDIAC ARREST (HCC): ICD-10-CM

## 2020-01-01 LAB
ACT BLD: >1000 SECS (ref 79–138)
ALBUMIN SERPL-MCNC: 3 G/DL (ref 3.5–5)
ALBUMIN/GLOB SERPL: 0.7 {RATIO} (ref 1.1–2.2)
ALP SERPL-CCNC: 151 U/L (ref 45–117)
ALT SERPL-CCNC: 95 U/L (ref 12–78)
ANION GAP SERPL CALC-SCNC: 20 MMOL/L (ref 5–15)
APTT PPP: 29 SEC (ref 22.1–32)
AST SERPL-CCNC: 106 U/L (ref 15–37)
BASOPHILS # BLD: 0.2 K/UL (ref 0–0.1)
BASOPHILS NFR BLD: 1 % (ref 0–1)
BILIRUB SERPL-MCNC: 0.3 MG/DL (ref 0.2–1)
BNP SERPL-MCNC: 3475 PG/ML
BUN SERPL-MCNC: 11 MG/DL (ref 6–20)
BUN/CREAT SERPL: 7 (ref 12–20)
CALCIUM SERPL-MCNC: 8.5 MG/DL (ref 8.5–10.1)
CHLORIDE SERPL-SCNC: 100 MMOL/L (ref 97–108)
CO2 SERPL-SCNC: 14 MMOL/L (ref 21–32)
COMMENT, HOLDF: NORMAL
COMMENT, HOLDF: NORMAL
CREAT SERPL-MCNC: 1.69 MG/DL (ref 0.55–1.02)
D DIMER PPP FEU-MCNC: >35.2 MG/L FEU (ref 0–0.65)
DIFFERENTIAL METHOD BLD: ABNORMAL
EOSINOPHIL # BLD: 0.2 K/UL (ref 0–0.4)
EOSINOPHIL NFR BLD: 1 % (ref 0–7)
ERYTHROCYTE [DISTWIDTH] IN BLOOD BY AUTOMATED COUNT: 13.2 % (ref 11.5–14.5)
GLOBULIN SER CALC-MCNC: 4.6 G/DL (ref 2–4)
GLUCOSE BLD STRIP.AUTO-MCNC: 567 MG/DL (ref 65–100)
GLUCOSE SERPL-MCNC: 698 MG/DL (ref 65–100)
HCT VFR BLD AUTO: 48.6 % (ref 35–47)
HGB BLD-MCNC: 14.6 G/DL (ref 11.5–16)
IMM GRANULOCYTES # BLD AUTO: 0 K/UL
IMM GRANULOCYTES NFR BLD AUTO: 0 %
LACTATE SERPL-SCNC: 10.2 MMOL/L (ref 0.4–2)
LYMPHOCYTES # BLD: 7.5 K/UL (ref 0.8–3.5)
LYMPHOCYTES NFR BLD: 40 % (ref 12–49)
MCH RBC QN AUTO: 26.7 PG (ref 26–34)
MCHC RBC AUTO-ENTMCNC: 30 G/DL (ref 30–36.5)
MCV RBC AUTO: 88.8 FL (ref 80–99)
MONOCYTES # BLD: 1.3 K/UL (ref 0–1)
MONOCYTES NFR BLD: 7 % (ref 5–13)
MYELOCYTES NFR BLD MANUAL: 2 %
NEUTS BAND NFR BLD MANUAL: 2 % (ref 0–6)
NEUTS SEG # BLD: 9.2 K/UL (ref 1.8–8)
NEUTS SEG NFR BLD: 47 % (ref 32–75)
NRBC # BLD: 0.02 K/UL (ref 0–0.01)
NRBC BLD-RTO: 0.1 PER 100 WBC
PLATELET # BLD AUTO: 157 K/UL (ref 150–400)
PMV BLD AUTO: 8.9 FL (ref 8.9–12.9)
POTASSIUM SERPL-SCNC: 4 MMOL/L (ref 3.5–5.1)
PROT SERPL-MCNC: 7.6 G/DL (ref 6.4–8.2)
RBC # BLD AUTO: 5.47 M/UL (ref 3.8–5.2)
RBC MORPH BLD: ABNORMAL
RBC MORPH BLD: ABNORMAL
SAMPLES BEING HELD,HOLD: NORMAL
SAMPLES BEING HELD,HOLD: NORMAL
SERVICE CMNT-IMP: ABNORMAL
SODIUM SERPL-SCNC: 134 MMOL/L (ref 136–145)
THERAPEUTIC RANGE,PTTT: NORMAL SECS (ref 58–77)
TROPONIN I SERPL-MCNC: 0.12 NG/ML
WBC # BLD AUTO: 18.7 K/UL (ref 3.6–11)

## 2020-01-01 PROCEDURE — 31500 INSERT EMERGENCY AIRWAY: CPT

## 2020-01-01 PROCEDURE — 96375 TX/PRO/DX INJ NEW DRUG ADDON: CPT

## 2020-01-01 PROCEDURE — 77030018836 HC SOL IRR NACL ICUM -A: Performed by: THORACIC SURGERY (CARDIOTHORACIC VASCULAR SURGERY)

## 2020-01-01 PROCEDURE — 77030015757: Performed by: THORACIC SURGERY (CARDIOTHORACIC VASCULAR SURGERY)

## 2020-01-01 PROCEDURE — 36415 COLL VENOUS BLD VENIPUNCTURE: CPT

## 2020-01-01 PROCEDURE — 85730 THROMBOPLASTIN TIME PARTIAL: CPT

## 2020-01-01 PROCEDURE — 92950 HEART/LUNG RESUSCITATION CPR: CPT

## 2020-01-01 PROCEDURE — 94002 VENT MGMT INPAT INIT DAY: CPT

## 2020-01-01 PROCEDURE — 99291 CRITICAL CARE FIRST HOUR: CPT

## 2020-01-01 PROCEDURE — 83605 ASSAY OF LACTIC ACID: CPT

## 2020-01-01 PROCEDURE — 83880 ASSAY OF NATRIURETIC PEPTIDE: CPT

## 2020-01-01 PROCEDURE — 87040 BLOOD CULTURE FOR BACTERIA: CPT

## 2020-01-01 PROCEDURE — 77030013386: Performed by: THORACIC SURGERY (CARDIOTHORACIC VASCULAR SURGERY)

## 2020-01-01 PROCEDURE — 84484 ASSAY OF TROPONIN QUANT: CPT

## 2020-01-01 PROCEDURE — 93355 ECHO TRANSESOPHAGEAL (TEE): CPT | Performed by: THORACIC SURGERY (CARDIOTHORACIC VASCULAR SURGERY)

## 2020-01-01 PROCEDURE — 87635 SARS-COV-2 COVID-19 AMP PRB: CPT

## 2020-01-01 PROCEDURE — 96374 THER/PROPH/DIAG INJ IV PUSH: CPT

## 2020-01-01 PROCEDURE — 76060000032 HC ANESTHESIA 0.5 TO 1 HR: Performed by: THORACIC SURGERY (CARDIOTHORACIC VASCULAR SURGERY)

## 2020-01-01 PROCEDURE — 77030018729 HC ELECTRD DEFIB PAD CARD -B: Performed by: THORACIC SURGERY (CARDIOTHORACIC VASCULAR SURGERY)

## 2020-01-01 PROCEDURE — 80053 COMPREHEN METABOLIC PANEL: CPT

## 2020-01-01 PROCEDURE — 85379 FIBRIN DEGRADATION QUANT: CPT

## 2020-01-01 PROCEDURE — C1892 INTRO/SHEATH,FIXED,PEEL-AWAY: HCPCS | Performed by: THORACIC SURGERY (CARDIOTHORACIC VASCULAR SURGERY)

## 2020-01-01 PROCEDURE — C1751 CATH, INF, PER/CENT/MIDLINE: HCPCS | Performed by: THORACIC SURGERY (CARDIOTHORACIC VASCULAR SURGERY)

## 2020-01-01 PROCEDURE — 77030013797 HC KT TRNSDUC PRSSR EDWD -A: Performed by: THORACIC SURGERY (CARDIOTHORACIC VASCULAR SURGERY)

## 2020-01-01 PROCEDURE — 77030041076 HC DRSG AG OPTICELL MDII -A: Performed by: THORACIC SURGERY (CARDIOTHORACIC VASCULAR SURGERY)

## 2020-01-01 PROCEDURE — 76010000112 HC CV SURG 0.5 TO 1 HR: Performed by: THORACIC SURGERY (CARDIOTHORACIC VASCULAR SURGERY)

## 2020-01-01 PROCEDURE — 77030011640 HC PAD GRND REM COVD -A: Performed by: THORACIC SURGERY (CARDIOTHORACIC VASCULAR SURGERY)

## 2020-01-01 PROCEDURE — 77030002996 HC SUT SLK J&J -A: Performed by: THORACIC SURGERY (CARDIOTHORACIC VASCULAR SURGERY)

## 2020-01-01 PROCEDURE — 85025 COMPLETE CBC W/AUTO DIFF WBC: CPT

## 2020-01-01 PROCEDURE — 77030031139 HC SUT VCRL2 J&J -A: Performed by: THORACIC SURGERY (CARDIOTHORACIC VASCULAR SURGERY)

## 2020-01-01 PROCEDURE — 77030028840 HC PMP VAD BLD CENTRIMAG STJU -L: Performed by: THORACIC SURGERY (CARDIOTHORACIC VASCULAR SURGERY)

## 2020-01-01 PROCEDURE — 85347 COAGULATION TIME ACTIVATED: CPT

## 2020-01-01 PROCEDURE — 74011000250 HC RX REV CODE- 250: Performed by: EMERGENCY MEDICINE

## 2020-01-01 PROCEDURE — 77030034689 HC VASC DIL KT W/NDL LIVA -C: Performed by: THORACIC SURGERY (CARDIOTHORACIC VASCULAR SURGERY)

## 2020-01-01 PROCEDURE — 74011250636 HC RX REV CODE- 250/636

## 2020-01-01 PROCEDURE — 82962 GLUCOSE BLOOD TEST: CPT

## 2020-01-01 PROCEDURE — 75810000455 HC PLCMT CENT VENOUS CATH LVL 2 5182

## 2020-01-01 PROCEDURE — 74011250636 HC RX REV CODE- 250/636: Performed by: EMERGENCY MEDICINE

## 2020-01-01 RX ORDER — SODIUM CHLORIDE 0.9 % (FLUSH) 0.9 %
5-40 SYRINGE (ML) INJECTION AS NEEDED
Status: CANCELLED | OUTPATIENT
Start: 2020-01-01

## 2020-01-01 RX ORDER — DEXTROSE MONOHYDRATE 100 MG/ML
0-250 INJECTION, SOLUTION INTRAVENOUS AS NEEDED
Status: CANCELLED | OUTPATIENT
Start: 2020-01-01

## 2020-01-01 RX ORDER — CALCIUM CHLORIDE INJECTION 100 MG/ML
INJECTION, SOLUTION INTRAVENOUS
Status: DISCONTINUED | OUTPATIENT
Start: 2020-01-01 | End: 2020-04-06 | Stop reason: HOSPADM

## 2020-01-01 RX ORDER — EPINEPHRINE 0.1 MG/ML
INJECTION INTRACARDIAC; INTRAVENOUS
Status: DISCONTINUED | OUTPATIENT
Start: 2020-01-01 | End: 2020-04-06 | Stop reason: HOSPADM

## 2020-01-01 RX ORDER — FACIAL-BODY WIPES
10 EACH TOPICAL DAILY PRN
Status: CANCELLED | OUTPATIENT
Start: 2020-01-01

## 2020-01-01 RX ORDER — DOBUTAMINE HYDROCHLORIDE 200 MG/100ML
2.5-1 INJECTION INTRAVENOUS
Status: CANCELLED | OUTPATIENT
Start: 2020-01-01

## 2020-01-01 RX ORDER — MAGNESIUM SULFATE 1 G/100ML
1 INJECTION INTRAVENOUS AS NEEDED
Status: CANCELLED | OUTPATIENT
Start: 2020-01-01

## 2020-01-01 RX ORDER — HYDROMORPHONE HYDROCHLORIDE 1 MG/ML
0.5 INJECTION, SOLUTION INTRAMUSCULAR; INTRAVENOUS; SUBCUTANEOUS
Status: CANCELLED | OUTPATIENT
Start: 2020-01-01

## 2020-01-01 RX ORDER — POLYETHYLENE GLYCOL 3350 17 G/17G
17 POWDER, FOR SOLUTION ORAL DAILY
Status: CANCELLED | OUTPATIENT
Start: 2020-04-02

## 2020-01-01 RX ORDER — HEPARIN SODIUM 5000 [USP'U]/ML
10000 INJECTION, SOLUTION INTRAVENOUS; SUBCUTANEOUS ONCE
Status: COMPLETED | OUTPATIENT
Start: 2020-01-01 | End: 2020-01-01

## 2020-01-01 RX ORDER — SUCCINYLCHOLINE CHLORIDE 20 MG/ML
200 INJECTION INTRAMUSCULAR; INTRAVENOUS
Status: COMPLETED | OUTPATIENT
Start: 2020-01-01 | End: 2020-01-01

## 2020-01-01 RX ORDER — POTASSIUM CHLORIDE 29.8 MG/ML
20 INJECTION INTRAVENOUS
Status: CANCELLED | OUTPATIENT
Start: 2020-01-01 | End: 2020-04-02

## 2020-01-01 RX ORDER — MIDAZOLAM HYDROCHLORIDE 1 MG/ML
1 INJECTION, SOLUTION INTRAMUSCULAR; INTRAVENOUS
Status: CANCELLED | OUTPATIENT
Start: 2020-01-01

## 2020-01-01 RX ORDER — CHLORHEXIDINE GLUCONATE 1.2 MG/ML
10 RINSE ORAL EVERY 12 HOURS
Status: CANCELLED | OUTPATIENT
Start: 2020-01-01

## 2020-01-01 RX ORDER — SODIUM CHLORIDE 9 MG/ML
1000 INJECTION, SOLUTION INTRAVENOUS ONCE
Status: DISCONTINUED | OUTPATIENT
Start: 2020-01-01 | End: 2020-01-01 | Stop reason: HOSPADM

## 2020-01-01 RX ORDER — DIPHENHYDRAMINE HCL 25 MG
25 CAPSULE ORAL
Status: CANCELLED | OUTPATIENT
Start: 2020-01-01

## 2020-01-01 RX ORDER — CEFAZOLIN SODIUM/WATER 2 G/20 ML
2 SYRINGE (ML) INTRAVENOUS EVERY 6 HOURS
Status: CANCELLED | OUTPATIENT
Start: 2020-01-01 | End: 2020-04-02

## 2020-01-01 RX ORDER — NITROGLYCERIN 20 MG/100ML
0-200 INJECTION INTRAVENOUS
Status: DISCONTINUED | OUTPATIENT
Start: 2020-01-01 | End: 2020-01-01

## 2020-01-01 RX ORDER — NITROGLYCERIN 20 MG/100ML
INJECTION INTRAVENOUS
Status: DISCONTINUED
Start: 2020-01-01 | End: 2020-01-01

## 2020-01-01 RX ORDER — MUPIROCIN 20 MG/G
OINTMENT TOPICAL 2 TIMES DAILY
Status: CANCELLED | OUTPATIENT
Start: 2020-01-01 | End: 2020-04-06

## 2020-01-01 RX ORDER — ONDANSETRON 2 MG/ML
4 INJECTION INTRAMUSCULAR; INTRAVENOUS
Status: CANCELLED | OUTPATIENT
Start: 2020-01-01

## 2020-01-01 RX ORDER — SODIUM CHLORIDE 9 MG/ML
9 INJECTION, SOLUTION INTRAVENOUS CONTINUOUS
Status: CANCELLED | OUTPATIENT
Start: 2020-01-01

## 2020-01-01 RX ORDER — GUAIFENESIN 100 MG/5ML
81 LIQUID (ML) ORAL DAILY
Status: CANCELLED | OUTPATIENT
Start: 2020-04-02

## 2020-01-01 RX ORDER — PROPOFOL 10 MG/ML
0-50 VIAL (ML) INTRAVENOUS
Status: CANCELLED | OUTPATIENT
Start: 2020-01-01

## 2020-01-01 RX ORDER — DIPHENHYDRAMINE HYDROCHLORIDE 50 MG/ML
25 INJECTION, SOLUTION INTRAMUSCULAR; INTRAVENOUS
Status: CANCELLED | OUTPATIENT
Start: 2020-01-01

## 2020-01-01 RX ORDER — INSULIN LISPRO 100 [IU]/ML
INJECTION, SOLUTION INTRAVENOUS; SUBCUTANEOUS
Status: CANCELLED | OUTPATIENT
Start: 2020-01-01

## 2020-01-01 RX ORDER — ALBUMIN HUMAN 50 G/1000ML
12.5 SOLUTION INTRAVENOUS
Status: CANCELLED | OUTPATIENT
Start: 2020-01-01

## 2020-01-01 RX ORDER — SUCCINYLCHOLINE CHLORIDE 20 MG/ML
INJECTION INTRAMUSCULAR; INTRAVENOUS
Status: COMPLETED
Start: 2020-01-01 | End: 2020-01-01

## 2020-01-01 RX ORDER — PROPOFOL 10 MG/ML
200 INJECTION, EMULSION INTRAVENOUS
Status: DISCONTINUED | OUTPATIENT
Start: 2020-01-01 | End: 2020-01-01 | Stop reason: HOSPADM

## 2020-01-01 RX ORDER — HYDROMORPHONE HYDROCHLORIDE 1 MG/ML
1 INJECTION, SOLUTION INTRAMUSCULAR; INTRAVENOUS; SUBCUTANEOUS
Status: CANCELLED | OUTPATIENT
Start: 2020-01-01

## 2020-01-01 RX ORDER — LANOLIN ALCOHOL/MO/W.PET/CERES
400 CREAM (GRAM) TOPICAL 2 TIMES DAILY
Status: CANCELLED | OUTPATIENT
Start: 2020-04-02

## 2020-01-01 RX ORDER — NALOXONE HYDROCHLORIDE 0.4 MG/ML
0.4 INJECTION, SOLUTION INTRAMUSCULAR; INTRAVENOUS; SUBCUTANEOUS AS NEEDED
Status: CANCELLED | OUTPATIENT
Start: 2020-01-01

## 2020-01-01 RX ORDER — AMOXICILLIN 250 MG
1 CAPSULE ORAL 2 TIMES DAILY
Status: CANCELLED | OUTPATIENT
Start: 2020-04-02

## 2020-01-01 RX ORDER — ALBUTEROL SULFATE 0.83 MG/ML
2.5 SOLUTION RESPIRATORY (INHALATION)
Status: CANCELLED | OUTPATIENT
Start: 2020-01-01

## 2020-01-01 RX ORDER — INSULIN GLARGINE 100 [IU]/ML
1-50 INJECTION, SOLUTION SUBCUTANEOUS
Status: CANCELLED | OUTPATIENT
Start: 2020-01-01 | End: 2020-04-02

## 2020-01-01 RX ORDER — MAGNESIUM SULFATE 100 %
4 CRYSTALS MISCELLANEOUS AS NEEDED
Status: CANCELLED | OUTPATIENT
Start: 2020-01-01

## 2020-01-01 RX ORDER — SUCCINYLCHOLINE CHLORIDE 20 MG/ML
200 INJECTION INTRAMUSCULAR; INTRAVENOUS
Status: DISCONTINUED | OUTPATIENT
Start: 2020-01-01 | End: 2020-01-01 | Stop reason: HOSPADM

## 2020-01-01 RX ORDER — SODIUM CHLORIDE 0.9 % (FLUSH) 0.9 %
10 SYRINGE (ML) INJECTION
Status: DISCONTINUED | OUTPATIENT
Start: 2020-01-01 | End: 2020-01-01

## 2020-01-01 RX ORDER — PROPOFOL 10 MG/ML
INJECTION, EMULSION INTRAVENOUS
Status: COMPLETED
Start: 2020-01-01 | End: 2020-01-01

## 2020-01-01 RX ORDER — BACITRACIN 500 UNIT/G
1 PACKET (EA) TOPICAL AS NEEDED
Status: CANCELLED | OUTPATIENT
Start: 2020-01-01

## 2020-01-01 RX ORDER — OXYCODONE HYDROCHLORIDE 5 MG/1
10 TABLET ORAL
Status: CANCELLED | OUTPATIENT
Start: 2020-01-01

## 2020-01-01 RX ORDER — SODIUM CHLORIDE 450 MG/100ML
10 INJECTION, SOLUTION INTRAVENOUS CONTINUOUS
Status: CANCELLED | OUTPATIENT
Start: 2020-01-01

## 2020-01-01 RX ORDER — SODIUM CHLORIDE 0.9 % (FLUSH) 0.9 %
5-40 SYRINGE (ML) INJECTION EVERY 8 HOURS
Status: CANCELLED | OUTPATIENT
Start: 2020-01-01

## 2020-01-01 RX ORDER — OXYCODONE HYDROCHLORIDE 5 MG/1
5 TABLET ORAL
Status: CANCELLED | OUTPATIENT
Start: 2020-01-01

## 2020-01-01 RX ORDER — SODIUM BICARBONATE 1 MEQ/ML
SYRINGE (ML) INTRAVENOUS
Status: DISCONTINUED | OUTPATIENT
Start: 2020-01-01 | End: 2020-04-06 | Stop reason: HOSPADM

## 2020-01-01 RX ORDER — HEPARIN SODIUM 10000 [USP'U]/100ML
10-36 INJECTION, SOLUTION INTRAVENOUS
Status: DISCONTINUED | OUTPATIENT
Start: 2020-01-01 | End: 2020-04-06 | Stop reason: HOSPADM

## 2020-01-01 RX ORDER — KETAMINE HYDROCHLORIDE 50 MG/ML
INJECTION, SOLUTION INTRAMUSCULAR; INTRAVENOUS
Status: DISCONTINUED
Start: 2020-01-01 | End: 2020-01-01 | Stop reason: HOSPADM

## 2020-01-01 RX ORDER — PROPOFOL 10 MG/ML
200 INJECTION, EMULSION INTRAVENOUS
Status: COMPLETED | OUTPATIENT
Start: 2020-01-01 | End: 2020-01-01

## 2020-01-01 RX ORDER — ROCURONIUM BROMIDE 10 MG/ML
INJECTION, SOLUTION INTRAVENOUS
Status: DISCONTINUED
Start: 2020-01-01 | End: 2020-01-01 | Stop reason: HOSPADM

## 2020-01-01 RX ADMIN — EPINEPHRINE 1 MG: 0.1 INJECTION INTRACARDIAC; INTRAVENOUS at 01:04

## 2020-01-01 RX ADMIN — EPINEPHRINE 1 MG: 0.1 INJECTION INTRACARDIAC; INTRAVENOUS at 02:04

## 2020-01-01 RX ADMIN — EPINEPHRINE 1 MG: 0.1 INJECTION INTRACARDIAC; INTRAVENOUS at 01:42

## 2020-01-01 RX ADMIN — EPINEPHRINE 1 MG: 0.1 INJECTION INTRACARDIAC; INTRAVENOUS at 00:49

## 2020-01-01 RX ADMIN — CALCIUM CHLORIDE 1 G: 100 INJECTION, SOLUTION INTRAVENOUS; INTRAVENTRICULAR at 00:57

## 2020-01-01 RX ADMIN — EPINEPHRINE 1 MG: 0.1 INJECTION INTRACARDIAC; INTRAVENOUS at 01:33

## 2020-01-01 RX ADMIN — EPINEPHRINE 1 MG: 0.1 INJECTION INTRACARDIAC; INTRAVENOUS at 00:58

## 2020-01-01 RX ADMIN — EPINEPHRINE 1 MG: 0.1 INJECTION INTRACARDIAC; INTRAVENOUS at 01:48

## 2020-01-01 RX ADMIN — EPINEPHRINE 1 MG: 0.1 INJECTION INTRACARDIAC; INTRAVENOUS at 02:24

## 2020-01-01 RX ADMIN — EPINEPHRINE 1 MG: 0.1 INJECTION INTRACARDIAC; INTRAVENOUS at 02:03

## 2020-01-01 RX ADMIN — EPINEPHRINE 1 MG: 0.1 INJECTION INTRACARDIAC; INTRAVENOUS at 01:51

## 2020-01-01 RX ADMIN — EPINEPHRINE 1 MG: 0.1 INJECTION INTRACARDIAC; INTRAVENOUS at 02:18

## 2020-01-01 RX ADMIN — ALTEPLASE 100 MG: KIT at 00:54

## 2020-01-01 RX ADMIN — EPINEPHRINE 1 MG: 0.1 INJECTION INTRACARDIAC; INTRAVENOUS at 00:52

## 2020-01-01 RX ADMIN — SODIUM BICARBONATE 100 MEQ: 84 INJECTION, SOLUTION INTRAVENOUS at 01:24

## 2020-01-01 RX ADMIN — EPINEPHRINE 1 MG: 0.1 INJECTION INTRACARDIAC; INTRAVENOUS at 02:09

## 2020-01-01 RX ADMIN — EPINEPHRINE 1 MG: 0.1 INJECTION INTRACARDIAC; INTRAVENOUS at 01:30

## 2020-01-01 RX ADMIN — HEPARIN SODIUM 10000 UNITS: 5000 INJECTION INTRAVENOUS; SUBCUTANEOUS at 01:20

## 2020-01-01 RX ADMIN — EPINEPHRINE 1 MG: 0.1 INJECTION INTRACARDIAC; INTRAVENOUS at 02:00

## 2020-01-01 RX ADMIN — EPINEPHRINE 1 MG: 0.1 INJECTION INTRACARDIAC; INTRAVENOUS at 01:36

## 2020-01-01 RX ADMIN — EPINEPHRINE 1 MG: 0.1 INJECTION INTRACARDIAC; INTRAVENOUS at 01:17

## 2020-01-01 RX ADMIN — EPINEPHRINE 1 MG: 0.1 INJECTION INTRACARDIAC; INTRAVENOUS at 02:30

## 2020-01-01 RX ADMIN — EPINEPHRINE 1 MG: 0.1 INJECTION INTRACARDIAC; INTRAVENOUS at 02:15

## 2020-01-01 RX ADMIN — SUCCINYLCHOLINE CHLORIDE 200 MG: 20 INJECTION, SOLUTION INTRAMUSCULAR; INTRAVENOUS at 00:45

## 2020-01-01 RX ADMIN — SODIUM BICARBONATE 50 MEQ: 84 INJECTION, SOLUTION INTRAVENOUS at 01:06

## 2020-01-01 RX ADMIN — PROPOFOL 200 MG: 10 INJECTION, EMULSION INTRAVENOUS at 00:45

## 2020-01-01 RX ADMIN — EPINEPHRINE 1 MG: 0.1 INJECTION INTRACARDIAC; INTRAVENOUS at 02:06

## 2020-01-01 RX ADMIN — EPINEPHRINE 1 MG: 0.1 INJECTION INTRACARDIAC; INTRAVENOUS at 02:27

## 2020-01-01 RX ADMIN — EPINEPHRINE 1 MG: 0.1 INJECTION INTRACARDIAC; INTRAVENOUS at 02:12

## 2020-01-01 RX ADMIN — EPINEPHRINE 1 MG: 0.1 INJECTION INTRACARDIAC; INTRAVENOUS at 01:57

## 2020-01-01 RX ADMIN — EPINEPHRINE 1 MG: 0.1 INJECTION INTRACARDIAC; INTRAVENOUS at 01:54

## 2020-01-01 RX ADMIN — EPINEPHRINE 1 MG: 0.1 INJECTION INTRACARDIAC; INTRAVENOUS at 01:13

## 2020-01-01 RX ADMIN — SUCCINYLCHOLINE CHLORIDE 200 MG: 20 INJECTION INTRAMUSCULAR; INTRAVENOUS at 00:45

## 2020-01-01 RX ADMIN — HEPARIN SODIUM 10 UNITS/KG/HR: 10000 INJECTION, SOLUTION INTRAVENOUS at 01:20

## 2020-01-01 RX ADMIN — EPINEPHRINE 1 MG: 0.1 INJECTION INTRACARDIAC; INTRAVENOUS at 01:10

## 2020-01-01 RX ADMIN — EPINEPHRINE 1 MG: 0.1 INJECTION INTRACARDIAC; INTRAVENOUS at 01:01

## 2020-01-01 RX ADMIN — EPINEPHRINE 1 MG: 0.1 INJECTION INTRACARDIAC; INTRAVENOUS at 01:45

## 2020-01-01 RX ADMIN — EPINEPHRINE 1 MG: 0.1 INJECTION INTRACARDIAC; INTRAVENOUS at 02:21

## 2020-01-01 RX ADMIN — EPINEPHRINE 1 MG: 0.1 INJECTION INTRACARDIAC; INTRAVENOUS at 01:24

## 2020-01-01 RX ADMIN — SODIUM BICARBONATE 50 MEQ: 84 INJECTION, SOLUTION INTRAVENOUS at 00:57

## 2020-01-01 RX ADMIN — EPINEPHRINE 1 MG: 0.1 INJECTION INTRACARDIAC; INTRAVENOUS at 01:39

## 2020-01-01 RX ADMIN — EPINEPHRINE 1 MG: 0.1 INJECTION INTRACARDIAC; INTRAVENOUS at 01:21

## 2020-04-01 PROBLEM — I26.99 PULMONARY EMBOLISM (HCC): Status: ACTIVE | Noted: 2020-01-01

## 2020-04-01 PROBLEM — I46.9 CARDIAC ARREST (HCC): Status: ACTIVE | Noted: 2020-01-01

## 2020-04-01 NOTE — BRIEF OP NOTE
BRIEF OP NOTE Pre-Op Diagnosis: Cardiac Arrest 
 
Post-Op Diagnosis: Cardiac Arrest 
 
Procedure: INITIATION VENOARTERIAL EXTRACORPOREAL MEMBRANE OXYGENATION (ECMO) VIA RIGHT COMMON FEMORAL ARTERY & VEIN; CAROL Surgeon: Huyen Monroy MD 
 
Assistant(s): ANGELA Jiang Anesthesia: General  
 
Infusions/Support: see anesthesia record Estimated Blood Loss: 10cc Cell Saver: N/A Specimens: * No specimens in log * Drains and pacing wires: none Complications: none Findings: Cardiac Arrest, Diffuse clot throughout aorta & heart Implants: * No implants in log *

## 2020-04-01 NOTE — Clinical Note
Right femoral vein. Accessed successfully. using fluoro and ultrasound guidance.  22 fr venous ECMO cannula

## 2020-04-01 NOTE — PROGRESS NOTES
Spiritual Care Assessment/Progress Note [address] 
 
 
NAME: Cecil Hammond      MRN: [id number] AGE: 38 y.o. SEX: female Orthodox Affiliation: Luh Castro Language: [place] 4/1/2020 Spiritual Assessment begun in [address] [facility] [address] DEP through conversation with: 
  
    []Patient        [] Family    [] Friend(s) Reason for Consult: Crisis, [place]os Spiritual beliefs: (Please include comment if needed) 
   [] Identifies with a manuel tradition:     
   [] Supported by a manuel community:        
   [] Claims no spiritual orientation:       
   [] Seeking spiritual identity:            
   [] Adheres to an individual form of spirituality:       
   [x] Not able to assess:                   
 
    
Identified resources for coping:  
   [] Prayer                           
   [] Music                  [] Guided Imagery 
   [] Family/friends                 [] Pet visits [] Devotional reading                         [] Unknown 
   [] Other:                                          
 
 
Interventions offered during this visit: (See comments for more details) Patient Interventions: Crisis, Stemi Plan of Care: 
 
 [] Support spiritual and/or cultural needs  
 [] Support AMD and/or advance care planning process    
 [] Support grieving process 
 [] Coordinate Rites and/or Rituals  
 [] Coordination with community clergy [] No spiritual needs identified at this time 
 [] Detailed Plan of Care below (See Comments)  [] Make referral to Music Therapy 
[] Make referral to Pet Therapy    
[] Make referral to Addiction services 
[] Make referral to [facility] 
[] Make referral to Spiritual Care Partner 
[] No future visits requested       
[] Follow up visits as needed Comments: Responded to Code Stemi called for Ms Felishaor in ED-15. Multiple staff members were attending to patient when  arrived; no family was present. Patient on isolation;  did not enter room. : Rev. Zhao Cisneros. Vin Morocho; [facility], to contact [phone] call: [phone]

## 2020-04-01 NOTE — ED TRIAGE NOTES
Pt arrives via EMS in respiratory distress. Non-rebreather in place, pt unable to answer any questions just keeps saying \"Im gonna die\" Per EMS pt was found by [employer] after calling 911 down on her front porch.

## 2020-04-01 NOTE — ED NOTES
Pulse checks to no longer be performed per Dr. Kei Vazquez. Dr. Kei Vazquez also speaking with the family at this time.

## 2020-04-01 NOTE — ED PROVIDER NOTES
79-year-old female was found screaming on the porch of her house after calling them when herself in extremis shouting that she was going to die breathing very rapidly, unable to elaborate further and saturating 86% on a nonrebreather in route to the hospital.  She is unable to provide any further history. The history is provided by the EMS personnel. Shortness of Breath This is a new problem. The problem occurs continuously. Past Medical History:  
Diagnosis Date  Back pain  Bowel obstruction 07/2008  Diabetes ([address]  Dysmenorrhea  Dyspepsia and other specified disorders of function of stomach  Hypertension  Maxillary sinusitis  Musculoskeletal disorder  Other specified disease of sebaceous glands  Rhinitis allergic  Stool color black  Ulcerative colitis  Variants of migraine, not elsewhere classified, with intractable migraine, so stated, without mention of status migrainosus Past Surgical History:  
Procedure Laterality Date  ABDOMEN SURGERY PROC UNLISTED    
 bowel resection  COLONOSCOPY Left 7/3/2019 COLONOSCOPY performed by Umang Brown MD at [facility] ENDOSCOPY  FLEXIBLE SIGMOIDOSCOPY N/A 9/13/2019 FLEXIBLE SIGMOIDOSCOPY performed by Umang Brown MD at [address] [place] [address] Family History:  
Problem Relation Age of Onset  Hypertension Mother  Diabetes Father  Heart Disease Father  Hypertension Father  Stroke Maternal Grandmother Social History Socioeconomic History  Marital status:  Spouse name: Not on file  Number of children: Not on file  Years of education: Not on file  Highest education level: Not on file Occupational History  Not on file Social Needs  Financial resource strain: Not on file  Food insecurity Worry: Not on file Inability: Not on file  Transportation needs Medical: Not on file Non-medical: Not on file Tobacco Use  Smoking status: Never Smoker  Smokeless tobacco: Never Used Substance and Sexual Activity  Alcohol use: Yes Comment: socially  Drug use: No  
 Sexual activity: Yes Lifestyle  Physical activity Days per week: Not on file Minutes per session: Not on file  Stress: Not on file Relationships  Social connections Talks on phone: Not on file Gets together: Not on file Attends Voodoo service: Not on file Active member of club or organization: Not on file Attends meetings of clubs or organizations: Not on file Relationship status: Not on file  Intimate partner violence Fear of current or ex partner: Not on file Emotionally abused: Not on file Physically abused: Not on file Forced sexual activity: Not on file Other Topics Concern  Not on file Social History Narrative  Not on file ALLERGIES: Latex Review of Systems Unable to perform ROS: Acuity of condition Respiratory: Positive for shortness of breath. Vitals:  
 04/01/20 0024 04/01/20 0025 BP:  (!) 176/152 Pulse:  (!) 139 Resp:  (!) 54 Weight: 136.7 kg (301 lb 5.9 oz) Physical Exam 
Vitals signs and nursing note reviewed. Constitutional:   
   General: She is in acute distress. Appearance: She is well-developed. She is obese. She is toxic-appearing. HENT:  
   Head: Normocephalic and atraumatic. Eyes:  
   Conjunctiva/sclera: Conjunctivae normal.  
Neck: Musculoskeletal: Neck supple. Cardiovascular:  
   Rate and Rhythm: Normal rate and regular rhythm. Pulmonary:  
   Effort: Tachypnea, accessory muscle usage and respiratory distress present. Breath sounds: Normal air entry. Wheezing and rales (bilateral) present. Abdominal:  
   General: There is no distension. Tenderness: There is no abdominal tenderness. Musculoskeletal: Normal range of motion. General: No deformity.   
Skin: 
 General: Skin is warm and dry. Neurological:  
   Cranial Nerves: No cranial nerve deficit. Psychiatric:     
   Mood and Affect: Mood is anxious. Behavior: Behavior is uncooperative. Comments: Unable to concentrate, repetitive moaning speech MDM 
  
50-year-old female presents in acute respiratory distress. Initial precautions were taken for COVID exposure because of acute presentation. She is profoundly tachypneic, tachycardic, and is unable to participate in exam or history taking. She was refractory to very high levels of oxygen. I attempted to maintain her on supplementary oxygen therapy and discussed high flow nasal cannula or CPAP to bridge her as I was concerned about her hemodynamics deteriorating under intubation conditions increasing her intrathoracic pressure dropping her preload. Heparin and alteplase orders placed for suspected massive PE that will likely be refractory to typical therapy. 12:25 AM 
I discussed the case with the intensivist nurse practitioner and they and the critical care attending came to bedside to evaluate the patient. Critical care team intubated and I noted PEA arrest from telemetry monitoring during procedure, I responded to the room to start resuscitation. After second round of epi I called to activate STEMI team for code shock algorithm and VA ECMO as there is likely no other salvage therapy at this point. Aggressive resuscitative measures and multiple shocks for runs of pulseless VT. I spoke with Dr. Braydon Lozada by phone who agreed to come evaluate the patient and placed a right femoral central line to bridge access. Continued compressions with Scottie Chris device were provided as well as manual bagging until Cath Lab team was available to move patient down and begin ECMO.   
 
While patient being transported to Cath Lab I was able to discuss the current situation with her sister Dayton by phone who is in [place] as well as her brother. I discussed the seriousness of the situation and low likelihood of survival.  They tell me that she has not left [place] and has been at home recently. I called to update the patient's sister of her death after discussion with ECMO team and offered my condolences. Emergency Focused Ultrasound Exam 
Limited Ultrasound of the Heart and Pericardium Performed and interpreted by Trent Sherwood MD 
Indication: shob Multiple views of the heart, pericardium, and IVC are obtained with a multi frequency probe. Findings: Normal contractility, anechoic fluid around heart not noted, no IVC collapse, enlarged RV taking over the apex and intraventricular bowing. Interpretation: Normal ejection fraction, no pericardial effusion , CVP is elevated. Right heart strain suggestive of massive PE. Central Line 
Date/Time: 4/1/2020 1:00 AM 
Performed by: Enrrique Lobo MD 
Authorized by: Enrrique Lobo MD  
 
Consent:  
  Consent obtained:  Emergent situation Pre-procedure details:  
  Hand hygiene: Hand hygiene performed prior to insertion Procedure details: Location:  R femoral 
  Site selection rationale:  VA ECMO access Patient position:  Flat Procedural supplies:  Triple lumen Catheter size:  7 Fr Landmarks identified: yes Ultrasound guidance: yes Number of attempts:  1 Successful placement: yes Post-procedure details: Post-procedure:  Dressing applied and line sutured Assessment:  Blood return through all ports and free fluid flow Patient tolerance of procedure: Tolerated well, no immediate complications Critical Care Time: 90 minutes I personally performed critical care time separate of billable procedures which may include ordering and interpretation of testing, ordering of medications, direct patient assessment and reassessment, discussion with consultants or family, and documentation.

## 2020-04-01 NOTE — PROGRESS NOTES
informed that Ms Palafox had  in Cath Lab and a family member was in ED waiting area and desired support. Accompanied ED charge nurse, Kirill Bryson to ED waiting area and introduced self to Ms Palafox's aunt. Provided emotional support and compassionate presence as ED nurse informed aunt of circumstances that led up to patient's death. Continued to provide active listening as aunt attempted to process her feelings of disbelief and shock. Acknowledged her feelings and concerns. With her permission had prayer on behalf of the  and family. Assured aunt of ongoing prayers on their behalf. : . Sung Reddy. Pj Turner; [facility], to contact [phone] call: [phone]

## 2020-04-01 NOTE — Clinical Note
Right femoral artery. Accessed successfully. using fluoro and ultrasound guidance.  17 fr arterial ECMO cannula

## 2020-04-01 NOTE — PROGRESS NOTES
13:00 - 14:45 (105) 15:00 - 15:30 (30) Code Shock Massive PE 
 
13:00 - called for code shock 13:15 - calling in OR and cath lab teams 13:30 - calling in teams 13:45 - at bedside; Racheal Elo device in place 14:00 - placing a-line 14:15 - going over plan with team  
 
14:30 - heading to cath lab 14:45 - prepping patient; getting lines and anesthesia set up 15:00 - on ECMO 
 
15:15 - going over CAROL; diffuse clot throughout heart and aorta; LV collapsed; RV extremely dilated 15:18 - pronounced patient death at 15:18 
 
15:30 - discussing with ED attending about family notification Risk of morbidity and mortality - high Medical decision making - high complexity Total critical care time - 135 minutes (CPT 83593, [id number] x 3) I personally spent the above critical care time. This is time spent at this critically ill patient's bedside / unit / floor actively involved in patient care as well as the coordination of care and discussions with the patient's family. This does not include any procedural time which has been billed separately.

## 2020-04-01 NOTE — CONSULTS
[facility] Consultation Subjective:  
  
Jose Ramon Hawthorne is a 38 y.o. female who was referred for cardiac surgery evaluation of cardiac arrest by [facility] ED. History is limited at this time as the patient is in extremis, receiving active CPR via [address] [place] [address] Avenue & there is minimal chart history available. Apparently the patient was found by EMS on her porch screaming and hypoxic. She was placed on nonrebreather in route and intubated on arrival. She subsequently suffered cardiac arrest, we were called emergently for ECMO. Further history is unobtainable as the patient is intubated and sedated. Past Medical History:  
Diagnosis Date  Back pain  Bowel obstruction 07/2008  Diabetes ([facility] [address]  Dysmenorrhea  Dyspepsia and other specified disorders of function of stomach  Hypertension  Maxillary sinusitis  Musculoskeletal disorder  Other specified disease of sebaceous glands  Rhinitis allergic  Stool color black  Ulcerative colitis  Variants of migraine, not elsewhere classified, with intractable migraine, so stated, without mention of status migrainosus Past Surgical History:  
Procedure Laterality Date  ABDOMEN SURGERY PROC UNLISTED    
 bowel resection  COLONOSCOPY Left 7/3/2019 COLONOSCOPY performed by Cricket Villagomez MD at [facility] ENDOSCOPY  FLEXIBLE SIGMOIDOSCOPY N/A 9/13/2019 FLEXIBLE SIGMOIDOSCOPY performed by Cricket Villagomez MD at [address] [place] [address] Social History Tobacco Use  Smoking status: Never Smoker  Smokeless tobacco: Never Used Substance Use Topics  Alcohol use: Yes Comment: socially Family History Problem Relation Age of Onset  Hypertension Mother  Diabetes Father  Heart Disease Father  Hypertension Father  Stroke Maternal Grandmother Prior to Admission medications Medication Sig Start Date End Date Taking? Authorizing Provider triamcinolone acetonide 0.05 % oint 430 g by Apply Externally route two (2) times a day. 4/15/19   Consuelo Ambriz MD  
   
Allergies Allergen Reactions  Latex Rash Review of Systems:  
 [x] Unable to obtain  ROS due to  []mental status change  [x]sedated   [x]intubated []Total of 13 systems reviewed as follows: 
Constitutional: negative fever, negative chills Eyes:   negative for amauroses fugax ENT:   negative sore throat,oral absecess Endocrine Negative for thyroid replacement Rx; goiter; DM Respiratory:  negative chronic cough,sputum production Cards:  negative for  palpitations, lower extremity edema, varicosities, Claudication GI:   negative for dysphagia, bleeding, nausea, vomiting, diarrhea, and  
  abdominal pain Genitourinary: negative for frequency, dysuria, BPH in men. Integument:  negative for rash and pruritus Hematologic:  negative for easy bruising; bleeding dyscarsia Musculoskel: negative for muscle weakness inhibiting ambulation Neurological:  negative for stroke, TIA, syncope, dizziness Behavl/Psych: negative for feelings of anxiety, depression Objective:  
 
VS:  
Visit Vitals BP (!) 176/152 (BP 1 Location: Right arm) Pulse (!) 139 Resp (!) 54 Wt 301 lb 5.9 oz (136.7 kg) BMI 44.50 kg/m² Physical Exam:   
General appearance: intubated, sedated, CPR ongoing Head: normocephalic, without obvious abnormality; atraumatic Eyes: conjunctivae/corneas clear Nose: nares normal; no drainage. Neck: no carotid bruit and no JVD Lungs: unable to obtain, CPR ongoing Heart: unable to obtain, CPR ongoing Abdomen: protuberant, rigid Extremities: generalized edema Skin: Skin color mottled Neurologic: intubated, sedated Labs:  
Recent Labs 04/01/20 
0021 04/01/20 
0014 WBC 18.7*  --   
HGB 14.6  --   
HCT 48.6*  --   
  --   
*  --   
K 4.0  --   
BUN 11  --   
CREA 1.69*  --   
*  --   
GLUCPOC  --  567* Diagnostics:  
PA and lateral: CXR Results  (Last 48 hours) None Assessment:  
 
Active Problems: * No active hospital problems. * 
 
 
Plan: 1. Cardiac arrest, possible PE: will proceed with emergent VA ECMO, Dr. Milena Toledo. Family updated by ED physician 2. Possible Covid-19: testing in progress 3. Lactic acidosis, Leukocytosis 4. Ulcerative colitis, hx of bowel resection 5. HTN 6. DM:  on BMP, will need insulin drip 7. Morbid obesity, BMI 44 Signed By: ANGELA Gardiner April 1, 2020 Saw patient, agree with above Risk of morbidity and mortality - high Medical decision making - high complexity 1. Cardiac arrest, possible PE: will proceed with emergent VA ECMO, Dr. Milena Toledo. Family updated by ED physician 2. Possible Covid-19: testing in progress 3. Lactic acidosis, Leukocytosis 4. Ulcerative colitis, hx of bowel resection 5. HTN 6. DM:  on BMP, will need insulin drip 7. Morbid obesity, BMI 44

## 2020-04-01 NOTE — PROCEDURES
[facility] Procedure Note - Intubation:  
Performed by Rivka Ye MD . Immediately prior to the procedure, the patient was reevaluated and found suitable for the planned procedure and any planned medications. Immediately prior to the procedure a time out was called to verify the correct patient, procedure, equipment, staff, and marking as appropriate. Medications given were succinylcholine and propofol. A number 7.5 cuffed ETT was placed to 26 cm at the teeth. Placement was evaluated by noting bilateral, symmetric breath sounds, good end-tidal CO2 detector color change  and no breath sounds over stomach. Attempts required: 1. Complications: none. RSI was used. Kamala Jewell The procedure was tolerated well.

## 2020-04-01 NOTE — DISCHARGE SUMMARY
[facility] Discharge Summary Patient ID: 
Addie Grady [id number] 
85 y.o. 
1980 Admit date: 2020 Discharge date: 2020 Admitting Physician: Felicia Cruz MD  
 
Referring Cardiologist:  none PCP:  WILMAR GOMES (Inactive) Admitting Diagnoses: Cardiac Arrest 
 
Discharge Diagnoses:  
 
Hospital Problems  Date Reviewed: 7/3/2019 Codes Class Noted POA * (Principal) Cardiac arrest [facility]) ICD-10-CM: I46.9 ICD-9-CM: 427.5  2020 Unknown DM (diabetes mellitus) ([facility] [address] ICD-10-CM: E11.9 ICD-9-CM: 250.00  10/11/2012 Yes HTN (hypertension) ICD-10-CM: I10 
ICD-9-CM: 401.9  10/11/2012 Yes Morbid obesity ([facility] [address] ICD-10-CM: E66.01 
ICD-9-CM: 278.01  10/11/2012 Yes Discharged Condition:  Disposition:  Procedures for this admission:  Procedure(s): EXTRACORPOREAL MEMBRANE OXYGENATION (ECMO) Discharge Medications: none HPI: Addie Grady is a 38 y.o. female who was referred for cardiac surgery evaluation of cardiac arrest by [facility] ED. History is limited at this time as the patient is in extremis, receiving active CPR via [address] [place] [address] & there is no chart history available. Apparently the patient was found by EMS on her porch screaming and hypoxic. She was placed on nonrebreather in route and intubated on arrival. She subsequently suffered cardiac arrest, we were called emergently for ECMO. Further history is unobtainable as the patient is intubated and sedated. Hospital Course: patient presented to the ED in extremis and was quickly intubated. Cardiac arrest ensued and taz device was placed. Femoral lines were obtained. With ongoing CPR, the patient was transported to the cath lab for emergent VA ECMO. Please refer to Dr. Consuelo Rose separately dictated op note for complete details. Briefly, the patient was placed on VA ECMO via right common femoral artery & vein with adequate flows.  Torri Ellis device was stopped. On subsequent CAROL, diffuse clot was found in the aorta & heart and further efforts were terminated per Dr. Cher Ortega. Patient was pronounced dead at [phone]. Discharge Vital Signs:  
Visit Vitals BP (!) 176/152 (BP 1 Location: Right arm) Pulse (!) 139 Resp (!) 54 Wt 301 lb 5.9 oz (136.7 kg) BMI 44.50 kg/m² Labs:  
Recent Labs 04/01/20 
0021 04/01/20 
0014 WBC 18.7*  --   
HGB 14.6  --   
HCT 48.6*  --   
  --   
*  --   
K 4.0  --   
BUN 11  --   
CREA 1.69*  --   
*  --   
GLUCPOC  --  567* Diagnostics: CXR Results  (Last 48 hours) None Patient Instructions/Follow Up Care: none Signed: 
ANGELA Ortega 
4/1/2020 
3:36 AM

## 2020-04-05 LAB — SARS-COV-2, COV2NT: NOT DETECTED

## 2020-04-05 NOTE — OP NOTES
[address]  
OPERATIVE REPORT Name:  Kyra Ortiz 
MR#:  [id number] :  1980 ACCOUNT #:  [de-identified] DATE OF SERVICE:  2020 PREOPERATIVE DIAGNOSES: 
1. Cardiogenic shock. 2.  Massive pulmonary embolism. POSTOPERATIVE DIAGNOSES: 
1. Cardiogenic shock. 2.  Massive pulmonary embolism. PROCEDURES PERFORMED: 
1. Insertion of peripheral arterial and venous cannula to be used for venoarterial extracorporeal membrane oxygenation (CPT code 12891). 2.  Extracorporeal membrane oxygenation initiation, venoarterial (CPT code 65095). 3.  Insertion of distal perfusion cannula for perfusion in the right lower extremity (CPT code 47984). SURGEON:  Hermila Aguilera MD 
 
ASSISTANT:  ANGELA Patton; ; LAURO assistance was needed due to difficulty of procedure, dissection, and identification of pertinent anatomy throughout the case ANESTHESIA:  GETA 
 
COMPLICATIONS:  Mortality. SPECIMENS REMOVED: none IMPLANTS:  None. ESTIMATED BLOOD LOSS:  10 mL. PROCEDURE:  The patient is a very pleasant 50-year-old woman who basically presented to the emergency room in acute respiratory distress and shortly after induction coded and started receiving CPR. I was emergently contacted. I then contacted the STEMI and OR teams to escort the patient to the cardiac catheterization laboratory. During this time, we gained access to the right common femoral artery and right common femoral vein. Eventually, when we got to the cardiac catheterization laboratory, we used fluoroscopic and ultrasound guidance to size up to a 17-Kosovan arterial and 22-Kosovan venous cannula which were eventually hooked up to the cardiopulmonary bypass circuit. We also placed a distal perfusion cannula down the right common femoral artery that we subsequently used to perfuse the leg after we went on ECMO.   Shortly after going on ECMO, we reviewed the patient's CAROL and discovered she had pretty significant clot throughout her ascending aorta. This did not appear to be a survivable event. We then discontinued the ECMO at that time. I was present for the entire procedure. MD ERICA Muller/[email or web address]_AARON/[email or web address] 
D:  04/05/2020 13:32 
T:  04/05/2020 18:14 
JOB #:  [id number]

## 2020-04-07 LAB
BACTERIA SPEC CULT: NORMAL
SERVICE CMNT-IMP: NORMAL

## 2020-04-07 NOTE — ED NOTES
I spoke with the patient's sister on the phone and gave them the results of the patient's [id number] test.  At the request of the patient's family, I also called [facility] and spoke to Mercedes Greenfield to give her the results of the test so that the family can begin making arrangements for travel back to their home country.

## (undated) DEVICE — SPONGE GZ W4XL4IN COT 12 PLY TYP VII WVN C FLD DSGN

## (undated) DEVICE — BW-412T DISP COMBO CLEANING BRUSH: Brand: SINGLE USE COMBINATION CLEANING BRUSH

## (undated) DEVICE — 1200 GUARD II KIT W/5MM TUBE W/O VAC TUBE: Brand: GUARDIAN

## (undated) DEVICE — CATH IV AUTOGRD BC BLU 22GA 25 -- INSYTE

## (undated) DEVICE — MEDI-TRACE CADENCE ADULT, DEFIBRILLATION ELECTRODE -RTS  (10 PR/PK) - PHILIPS: Brand: MEDI-TRACE CADENCE

## (undated) DEVICE — SYRINGE MED 20ML STD CLR PLAS LUERLOCK TIP N CTRL DISP

## (undated) DEVICE — Device: Brand: MEDICAL ACTION INDUSTRIES

## (undated) DEVICE — SUTURE PERMA-HAND 0 L18IN NONABSORBABLE BLK CT-1 L36MM 1/2 C021D

## (undated) DEVICE — PUMP BLD CENTRIMAG

## (undated) DEVICE — Device

## (undated) DEVICE — FORCEPS BX L240CM JAW DIA2.8MM L CAP W/ NDL MIC MESH TOOTH

## (undated) DEVICE — UNDERPAD XTR STRGHT 30X36IN --

## (undated) DEVICE — NEEDLE HYPO 18GA L1.5IN PNK S STL HUB POLYPR SHLD REG BVL

## (undated) DEVICE — BAG SPEC BIOHZD LF 2MIL 6X10IN -- CONVERT TO ITEM 357326

## (undated) DEVICE — MICROPUNCTURE INTRODUCER SET SILHOUETTE TRANSITIONLESS WITH STAINLESS STEEL WIRE GUIDE: Brand: MICROPUNCTURE

## (undated) DEVICE — BAG BELONG PT PERS CLEAR HANDL

## (undated) DEVICE — QUILTED PREMIUM COMFORT UNDERPAD,EXTRA HEAVY: Brand: WINGS

## (undated) DEVICE — STERILE POLYISOPRENE POWDER-FREE SURGICAL GLOVES WITH EMOLLIENT COATING: Brand: PROTEXIS

## (undated) DEVICE — SPONGE LAP 18X18IN STRL -- 5/PK

## (undated) DEVICE — NEONATAL-ADULT SPO2 SENSOR: Brand: NELLCOR

## (undated) DEVICE — SOLIDIFIER FLUID 3000 CC ABSORB

## (undated) DEVICE — CANNULA PERF AD 17FR L18CM FEM ART CONN VENT 1 PC ELONG KINK

## (undated) DEVICE — (D)PREP SKN CHLRAPRP APPL 26ML -- CONVERT TO ITEM 371833

## (undated) DEVICE — SENSOR TEMP SKIN DISP

## (undated) DEVICE — ENDO CARRY-ON PROCEDURE KIT INCLUDES ENZYMATIC SPONGE, GAUZE, BIOHAZARD LABEL, TRAY, LUBRICANT, DIRTY SCOPE LABEL, WATER LABEL, TRAY, DRAWSTRING PAD, AND DEFENDO 4-PIECE KIT.: Brand: ENDO CARRY-ON PROCEDURE KIT

## (undated) DEVICE — SYR 50ML SLIP TIP NSAF LF STRL --

## (undated) DEVICE — CONTAINER SPEC 20 ML LID NEUT BUFF FORMALIN 10 % POLYPR STS

## (undated) DEVICE — SET ADMIN 16ML TBNG L100IN 2 Y INJ SITE IV PIGGY BK DISP

## (undated) DEVICE — TIDISHIELD TRANSPORT, CONTAINMENT COVER FOR BACK TABLE 4'6" (1.37M) TO 8' (2.43M) IN LENGTH: Brand: TIDISHIELD

## (undated) DEVICE — KENDALL RADIOLUCENT FOAM MONITORING ELECTRODE -RECTANGULAR SHAPE: Brand: KENDALL

## (undated) DEVICE — STRAP,POSITIONING,KNEE/BODY,FOAM,4X60": Brand: MEDLINE

## (undated) DEVICE — Z DISCONTINUED NO SUB IDED SET EXTN W/ 4 W STPCOCK M SPIN LOK 36IN

## (undated) DEVICE — INTENDED FOR TISSUE SEPARATION, AND OTHER PROCEDURES THAT REQUIRE A SHARP SURGICAL BLADE TO PUNCTURE OR CUT.: Brand: BARD-PARKER ® CARBON RIB-BACK BLADES

## (undated) DEVICE — Z DISCONTINUED NO SUB IDED CANNULA PERF 22FR FEM VEN THN WALL WIRE WND L INTERSPACED

## (undated) DEVICE — GOWN,SIRUS,NONRNF,SETINSLV,XL,20/CS: Brand: MEDLINE

## (undated) DEVICE — DRESSING SIL W4XL5IN ANTIBACT GELLING FBR CYTOFORM

## (undated) DEVICE — SOLUTION IV 1000ML 0.9% SOD CHL

## (undated) DEVICE — REM POLYHESIVE ADULT PATIENT RETURN ELECTRODE: Brand: VALLEYLAB

## (undated) DEVICE — BAG RED 3PLY 2MIL 30X40 IN

## (undated) DEVICE — Device: Brand: VASCULAR DILATOR KIT

## (undated) DEVICE — Z DISCONTINUED USE 2751540 TUBING IRRIG L10IN DISP PMP ENDOGATOR

## (undated) DEVICE — CONNECTOR TBNG AUX H2O JET DISP FOR OLY 160/180 SER

## (undated) DEVICE — AIRLIFE™ U/CONNECT-IT OXYGEN TUBING 7 FEET (2.1 M) CRUSH-RESISTANT OXYGEN TUBING, VINYL TIPPED: Brand: AIRLIFE™

## (undated) DEVICE — GLOVE SURG SZ 7.5 L11.2IN THK9.8MIL STRW LTX POLYMER BEAD

## (undated) DEVICE — CANN NASAL O2 CAPNOGRAPHY AD -- FILTERLINE

## (undated) DEVICE — WRISTBAND ID AD W1XL11.5IN RED POLY ALRG PREPRINTED PERM